# Patient Record
Sex: MALE | Race: WHITE | Employment: UNEMPLOYED | URBAN - METROPOLITAN AREA
[De-identification: names, ages, dates, MRNs, and addresses within clinical notes are randomized per-mention and may not be internally consistent; named-entity substitution may affect disease eponyms.]

---

## 2017-01-17 ENCOUNTER — PATIENT OUTREACH (OUTPATIENT)
Dept: CARDIOLOGY CLINIC | Age: 62
End: 2017-01-17

## 2017-01-17 ENCOUNTER — TELEPHONE (OUTPATIENT)
Dept: CARDIOLOGY CLINIC | Age: 62
End: 2017-01-17

## 2017-01-17 NOTE — TELEPHONE ENCOUNTER
Mr. Abdullahi missed his hospital follow up appointment on 1/16/17.  I as able to speak with him and he states that he doesn't feel he needs to be seen since the problem he was having was not heart related.     Thank you, Cassandra

## 2017-01-17 NOTE — PROGRESS NOTES
Received notification that Mr. Lito Carter did not attend hospital follow up appointment. Mr. Lito Carter had been in-pt at [facility] from 12/14-12/20/17 for acute respiratory failure, and was are-admitted 12/26-12/30/17- was discharged from [facility] to [facility] from 12/30-1/6/17. NOw is at home with [facility] services in place. During second admission he had NSTEMI from cardiac strain. Cardiac PMH HTN. Left  asking for return call- he called later- he said he is doing well. Denies any symptoms of breathing, fluid or chest discomfort- decreased activity tolerance. Explained that he had a heart attack during his hospital stay the second admission at [facility] before he went to [facility] rehab. The appointment was scheduled to follow up on that- explained that the notes say that a stress test will most likely be done to assess heart muscle damage-ischemia. He agreed to an appointment. Had spoken with his mother (he is still staying with her- until his house gets some home improvements done) and rescheduled for this coming Monday. His mother relayed that he is seeing a new PCP at the [place] Friday- she will bring his name, phone number and fax number so we can add to the care team and asked that Mr. Abdullahi add Dr. Leatha Mantilla to his care team so the [place] could share information with our office as well. Torres Matias is still working with him post discharge from [facility] on 1/6/17- he has nursing, PT and OT. Pt was just there this afternoon when I had left the  for Mr. Abdullahi the first time. Provided both with my contact information - introduced self and NN role.

## 2017-01-17 NOTE — TELEPHONE ENCOUNTER
Mr. Noa Cueto missed his hospital follow up appointment on 1/16/17. I as able to speak with him and he states that he doesn't feel he needs to be seen since the problem he was having was not heart related.      Thank you, Shawnee Shannon

## 2017-01-23 ENCOUNTER — OFFICE VISIT (OUTPATIENT)
Dept: CARDIOLOGY CLINIC | Age: 62
End: 2017-01-23

## 2017-01-23 VITALS
BODY MASS INDEX: 37.37 KG/M2 | HEIGHT: 70 IN | HEART RATE: 91 BPM | SYSTOLIC BLOOD PRESSURE: 120 MMHG | DIASTOLIC BLOOD PRESSURE: 70 MMHG | WEIGHT: 261 LBS | RESPIRATION RATE: 20 BRPM

## 2017-01-23 DIAGNOSIS — J44.1 COPD WITH ACUTE EXACERBATION (HCC): ICD-10-CM

## 2017-01-23 DIAGNOSIS — E78.00 HYPERCHOLESTEREMIA: ICD-10-CM

## 2017-01-23 DIAGNOSIS — I21.4 NSTEMI (NON-ST ELEVATED MYOCARDIAL INFARCTION) (HCC): Primary | ICD-10-CM

## 2017-01-23 NOTE — PATIENT INSTRUCTIONS
Myrna Stress nuclear for positive troponin ASAP  Follow up in 4 months with Dr. Linh Almanza  Phone follow up of stress results

## 2017-01-23 NOTE — PROGRESS NOTES
Office Follow-up    NAME: Ailin Escobar    :  1955  MRM:  [id number]    Date:  2017            Assessment:     Problem List  Date Reviewed: 2017          Codes Class Noted    NSTEMI (non-ST elevated myocardial infarction) [facility]) ICD-10-CM: I21.4  ICD-9-CM: 410.70  2017        Anis[place]oria ICD-10-CM: H57.02  ICD-9-CM: 379.41  2016        History of Bell's palsy (Chronic) ICD-10-CM: Z86.69  ICD-9-CM: V12.49  2016    Overview Signed 2016  4:00 PM by LAMONT Castillo bell's palsy hx             Acute respiratory failure ([facility] ICD-10-CM: J96.00  ICD-9-CM: 518.81  2016        Acute on chronic respiratory failure ([facility] ICD-10-CM: J96.20  ICD-9-CM: 518.84  2016        Elevated troponin ICD-10-CM: R79.89  ICD-9-CM: 790.6  2016        Depression with anxiety ICD-10-CM: F41.8  ICD-9-CM: 300.4  2016        Chronic back pain greater than 3 months duration ICD-10-CM: M54.9, G89.29  ICD-9-CM: 724.5, 338.29  6/15/2016        Chronic pain of both knees ICD-10-CM: M25.561, M25.562, G89.29  ICD-9-CM: 719.46, 338.29  6/15/2016        COPD with acute exacerbation [facility]) ICD-10-CM: J44.1  ICD-9-CM: 491.21  2016        Chronic respiratory insufficiency ICD-10-CM: R06.89  ICD-9-CM: 786.09  Unknown        GERD (gastroesophageal reflux disease) ICD-10-CM: K21.9  ICD-9-CM: 530.81  Unknown        Hypercholesteremia ICD-10-CM: E78.00  ICD-9-CM: 272.0  Unknown        COPD (chronic obstructive pulmonary disease) ([facility] ICD-10-CM: J44.9  ICD-9-CM: 786  Unknown        Hypothyroidism ICD-10-CM: E03.9  ICD-9-CM: 244.9  Unknown        Chronic pain due to injury ICD-10-CM: G89.21  ICD-9-CM: 338.21  Unknown        Acute on chronic respiratory failure with hypoxia and hypercapnia (HCC) ICD-10-CM: J96.21, J96.22  ICD-9-CM: 518.84, 786.09, 799.02  2013        Narcotic overdose ICD-10-CM: T40.601A  ICD-9-CM: 967.9, E980.2  2013        Hypercapnemia ICD-10-CM: R06.89  ICD-9-CM: 786.09  2/22/2013                 Plan:     1. Positive troponin/mild HK of inferior wall: further evaluation with Lexiscan stress nuclear study. [facility]. 2. COPD: continue nasal O2  3. HTN: continue labetalol  4. Follow up in 4 months                        Subjective:     Oskar Okeefe, a 63y.o. year-old who presents for hospital follow up after being admitted for respiratory failure. He was on BiPAP and recovered from what seemed to be COPD exacerbation. At the time of presentation he had positive troponin at 0.22. He also had echo which showed basal inferior wall hypo kinesis. He was discharged home to be fu in office for positive troponin. He denies any symptoms of chest pain lightheadedness or dizziness. He has baseline SOB from COPD and uses 1.5 to 2 liters of oxygen continuously. He conveyed that he had prior cardiac cath at [place] which we do not have access right now. He also is on lung transplant list     Exam:     Physical Exam:  Visit Vitals    /70 (BP 1 Location: Left arm, BP Patient Position: Sitting)    Pulse 91    Resp 20    Ht 5' 10\" (1.778 m)    Wt 261 lb (118.4 kg)    BMI 37.45 kg/m2     General appearance - alert, well appearing, and in no distress  Mental status - affect appropriate to mood  Eyes - sclera anicteric, moist mucous membranes  Neck - supple, no significant adenopathy  Chest - Diffuse decrease in air entry in both lung fields. No wheezes, rales or rhonchi  Heart - normal rate, regular rhythm, normal S1, S2, no murmurs, rubs, clicks or gallops  Abdomen - soft, nontender, nondistended, no masses or organomegaly  Extremities - peripheral pulses normal, no pedal edema  Skin - normal coloration  no rashes    Medications:     Current Outpatient Prescriptions   Medication Sig    oxyCODONE IR (ROXICODONE) 5 mg immediate release tablet Take 1 Tab by mouth every four (4) hours as needed for Pain.  Max Daily Amount: 30 mg.    labetalol (NORMODYNE) 100 mg tablet Take 1 Tab by mouth two (2) times a day.  levothyroxine (SYNTHROID) 50 mcg tablet Take 1 Tab by mouth Daily (before breakfast).  predniSONE (DELTASONE) 10 mg tablet Take 40mg (4 tabs) daily for 3 days, then 30mg (3 tabs) daily for 3 days, then 20mg (2 tabs) daily for 3 days, then 10mg daily for 3 days (Patient taking differently: Take 10 mg by mouth daily. Take 40mg (4 tabs) daily for 3 days, then 30mg (3 tabs) daily for 3 days, then 20mg (2 tabs) daily for 3 days, then 10mg daily for 3 days)    DULoxetine (CYMBALTA) 30 mg capsule Take 90 mg by mouth daily.  senna-docusate (PERICOLACE) 8.6-50 mg per tablet Take 1 Tab by mouth two (2) times a day. (Patient taking differently: Take 3 Tabs by mouth daily.)    cholecalciferol (VITAMIN D3) 1,000 unit tablet Take 1,000 Units by mouth daily.  fluticasone (FLONASE) 50 mcg/actuation nasal spray 1 [place] by Both Nostrils route daily.  albuterol (PROVENTIL HFA, VENTOLIN HFA, PROAIR HFA) 90 mcg/actuation inhaler Take 2 Puffs by inhalation every four (4) hours as needed for Wheezing.  loratadine (CLARITIN) 10 mg tablet Take 10 mg by mouth daily.  budesonide-formoterol (SYMBICORT) 160-4.5 mcg/actuation HFA inhaler Take 2 Puffs by inhalation two (2) times a day.  vardenafil (LEVITRA) 20 mg tablet Take 20 mg by mouth as needed. One tab one hr prior to activity. Max twice/month    tiotropium (SPIRIVA WITH HANDIHALER) 18 mcg inhalation capsule Take 1 Cap by inhalation daily.  albuterol (PROVENTIL VENTOLIN) 2.5 mg /3 mL (0.083 %) nebulizer solution 2.5 mg by Nebulization route four (4) times daily as needed.  multivitamin with iron tablet Take 1 Tab by mouth daily.  busPIRone (BUSPAR) 10 mg tablet Take 20 mg by mouth three (3) times daily.  gabapentin (NEURONTIN) 300 mg tablet Take 300 mg by mouth every six (6) hours.  simvastatin (ZOCOR) 20 mg tablet Take 10 mg by mouth nightly.     buPROPion SR (WELLBUTRIN SR) 100 mg SR tablet Take 100 mg by mouth daily. No current facility-administered medications for this visit. Diagnostic Data Review:     EKG:    ECHO 12/26/2016: EF 55%, mild HK basal inferior wall(s). Mild LVH. Lab Review:     Lab Results   Component Value Date/Time    Cholesterol, total 179 12/27/2016 02:24 AM    HDL Cholesterol 85 12/27/2016 02:24 AM    LDL, calculated 72.4 12/27/2016 02:24 AM    Triglyceride 108 12/27/2016 02:24 AM    CHOL/HDL Ratio 2.1 12/27/2016 02:24 AM     Lab Results   Component Value Date/Time    Creatinine (POC) 0.8 08/27/2014 07:02 PM    Creatinine 0.89 12/30/2016 04:31 AM     Lab Results   Component Value Date/Time    BUN 18 12/30/2016 04:31 AM    BUN (POC) 10 08/27/2014 07:02 PM     Lab Results   Component Value Date/Time    Potassium 3.6 12/30/2016 04:31 AM     No results found for: HBA1C, HGBE8, TBY8JKKR, XRT1OJCZ  Lab Results   Component Value Date/Time    Hemoglobin (POC) 14.6 08/27/2014 07:02 PM    HGB 11.7 12/31/2016 02:45 AM     Lab Results   Component Value Date/Time    PLATELET 215 92/95/8206 02:45 AM     No results for input(s): CPK, CKMB, TROIQ in the last 72 hours. No lab exists for component: CKQMB, CPKMB             ___________________________________________________    Alessandra Jimenez.  Job Garvin MD, [facility] [place]    This note was written by beni Ruiz, as dictated by Madi Livingston MD.

## 2017-02-02 ENCOUNTER — HOSPITAL ENCOUNTER (INPATIENT)
Age: 62
LOS: 2 days | Discharge: HOME | DRG: 189 | End: 2017-02-04
Attending: EMERGENCY MEDICINE | Admitting: INTERNAL MEDICINE
Payer: MEDICARE

## 2017-02-02 ENCOUNTER — APPOINTMENT (OUTPATIENT)
Dept: GENERAL RADIOLOGY | Age: 62
DRG: 189 | End: 2017-02-02
Attending: EMERGENCY MEDICINE
Payer: MEDICARE

## 2017-02-02 DIAGNOSIS — J44.1 COPD EXACERBATION (HCC): Primary | ICD-10-CM

## 2017-02-02 DIAGNOSIS — R06.09 DYSPNEA ON EXERTION: ICD-10-CM

## 2017-02-02 DIAGNOSIS — R77.8 ELEVATED TROPONIN I LEVEL: ICD-10-CM

## 2017-02-02 LAB
ANION GAP BLD CALC-SCNC: 0 MMOL/L (ref 5–15)
BASOPHILS # BLD AUTO: 0 K/UL (ref 0–0.1)
BASOPHILS # BLD: 0 % (ref 0–1)
BNP SERPL-MCNC: 213 PG/ML (ref 0–125)
BUN SERPL-MCNC: 11 MG/DL (ref 6–20)
BUN/CREAT SERPL: 14 (ref 12–20)
CALCIUM SERPL-MCNC: 8.9 MG/DL (ref 8.5–10.1)
CHLORIDE SERPL-SCNC: 104 MMOL/L (ref 97–108)
CK SERPL-CCNC: 43 U/L (ref 39–308)
CO2 SERPL-SCNC: 40 MMOL/L (ref 21–32)
CREAT SERPL-MCNC: 0.81 MG/DL (ref 0.7–1.3)
EOSINOPHIL # BLD: 0 K/UL (ref 0–0.4)
EOSINOPHIL NFR BLD: 0 % (ref 0–7)
ERYTHROCYTE [DISTWIDTH] IN BLOOD BY AUTOMATED COUNT: 13.4 % (ref 11.5–14.5)
GLUCOSE SERPL-MCNC: 119 MG/DL (ref 65–100)
HCT VFR BLD AUTO: 34.9 % (ref 36.6–50.3)
HGB BLD-MCNC: 10.6 G/DL (ref 12.1–17)
LYMPHOCYTES # BLD AUTO: 15 % (ref 12–49)
LYMPHOCYTES # BLD: 1.1 K/UL (ref 0.8–3.5)
MAGNESIUM SERPL-MCNC: 2 MG/DL (ref 1.6–2.4)
MCH RBC QN AUTO: 29.4 PG (ref 26–34)
MCHC RBC AUTO-ENTMCNC: 30.4 G/DL (ref 30–36.5)
MCV RBC AUTO: 96.9 FL (ref 80–99)
MONOCYTES # BLD: 0.5 K/UL (ref 0–1)
MONOCYTES NFR BLD AUTO: 6 % (ref 5–13)
NEUTS SEG # BLD: 5.8 K/UL (ref 1.8–8)
NEUTS SEG NFR BLD AUTO: 79 % (ref 32–75)
PLATELET # BLD AUTO: 276 K/UL (ref 150–400)
POTASSIUM SERPL-SCNC: 4 MMOL/L (ref 3.5–5.1)
RBC # BLD AUTO: 3.6 M/UL (ref 4.1–5.7)
SODIUM SERPL-SCNC: 144 MMOL/L (ref 136–145)
TROPONIN I SERPL-MCNC: 0.05 NG/ML
TROPONIN I SERPL-MCNC: 0.07 NG/ML
WBC # BLD AUTO: 7.4 K/UL (ref 4.1–11.1)

## 2017-02-02 PROCEDURE — 83735 ASSAY OF MAGNESIUM: CPT | Performed by: EMERGENCY MEDICINE

## 2017-02-02 PROCEDURE — 77030013140 HC MSK NEB VYRM -A

## 2017-02-02 PROCEDURE — 99285 EMERGENCY DEPT VISIT HI MDM: CPT

## 2017-02-02 PROCEDURE — 94640 AIRWAY INHALATION TREATMENT: CPT

## 2017-02-02 PROCEDURE — 74011250636 HC RX REV CODE- 250/636: Performed by: EMERGENCY MEDICINE

## 2017-02-02 PROCEDURE — 74011250637 HC RX REV CODE- 250/637: Performed by: INTERNAL MEDICINE

## 2017-02-02 PROCEDURE — 80048 BASIC METABOLIC PNL TOTAL CA: CPT | Performed by: EMERGENCY MEDICINE

## 2017-02-02 PROCEDURE — 94762 N-INVAS EAR/PLS OXIMTRY CONT: CPT

## 2017-02-02 PROCEDURE — 82550 ASSAY OF CK (CPK): CPT | Performed by: EMERGENCY MEDICINE

## 2017-02-02 PROCEDURE — 74011000250 HC RX REV CODE- 250: Performed by: INTERNAL MEDICINE

## 2017-02-02 PROCEDURE — 36415 COLL VENOUS BLD VENIPUNCTURE: CPT | Performed by: INTERNAL MEDICINE

## 2017-02-02 PROCEDURE — 71020 XR CHEST PA LAT: CPT

## 2017-02-02 PROCEDURE — 74011250636 HC RX REV CODE- 250/636: Performed by: INTERNAL MEDICINE

## 2017-02-02 PROCEDURE — 74011000250 HC RX REV CODE- 250: Performed by: EMERGENCY MEDICINE

## 2017-02-02 PROCEDURE — 81001 URINALYSIS AUTO W/SCOPE: CPT | Performed by: EMERGENCY MEDICINE

## 2017-02-02 PROCEDURE — 84484 ASSAY OF TROPONIN QUANT: CPT | Performed by: EMERGENCY MEDICINE

## 2017-02-02 PROCEDURE — 96374 THER/PROPH/DIAG INJ IV PUSH: CPT

## 2017-02-02 PROCEDURE — 85025 COMPLETE CBC W/AUTO DIFF WBC: CPT | Performed by: EMERGENCY MEDICINE

## 2017-02-02 PROCEDURE — 83880 ASSAY OF NATRIURETIC PEPTIDE: CPT | Performed by: EMERGENCY MEDICINE

## 2017-02-02 PROCEDURE — 65660000000 HC RM CCU STEPDOWN

## 2017-02-02 RX ORDER — SODIUM CHLORIDE 0.9 % (FLUSH) 0.9 %
5-10 SYRINGE (ML) INJECTION EVERY 8 HOURS
Status: DISCONTINUED | OUTPATIENT
Start: 2017-02-02 | End: 2017-02-04 | Stop reason: HOSPADM

## 2017-02-02 RX ORDER — IPRATROPIUM BROMIDE AND ALBUTEROL SULFATE 2.5; .5 MG/3ML; MG/3ML
3 SOLUTION RESPIRATORY (INHALATION)
Status: DISCONTINUED | OUTPATIENT
Start: 2017-02-02 | End: 2017-02-04 | Stop reason: HOSPADM

## 2017-02-02 RX ORDER — ONDANSETRON 2 MG/ML
4 INJECTION INTRAMUSCULAR; INTRAVENOUS
Status: DISCONTINUED | OUTPATIENT
Start: 2017-02-02 | End: 2017-02-04 | Stop reason: HOSPADM

## 2017-02-02 RX ORDER — HYDROCODONE BITARTRATE AND ACETAMINOPHEN 5; 325 MG/1; MG/1
1 TABLET ORAL
Status: DISCONTINUED | OUTPATIENT
Start: 2017-02-02 | End: 2017-02-04 | Stop reason: HOSPADM

## 2017-02-02 RX ORDER — MELATONIN
1000 DAILY
Status: DISCONTINUED | OUTPATIENT
Start: 2017-02-03 | End: 2017-02-04 | Stop reason: HOSPADM

## 2017-02-02 RX ORDER — PREDNISONE 10 MG/1
10 TABLET ORAL
COMMUNITY
End: 2017-02-04

## 2017-02-02 RX ORDER — FLUTICASONE FUROATE AND VILANTEROL 100; 25 UG/1; UG/1
1 POWDER RESPIRATORY (INHALATION) DAILY
Status: DISCONTINUED | OUTPATIENT
Start: 2017-02-03 | End: 2017-02-03

## 2017-02-02 RX ORDER — LORATADINE 10 MG/1
10 TABLET ORAL DAILY
Status: DISCONTINUED | OUTPATIENT
Start: 2017-02-03 | End: 2017-02-04 | Stop reason: HOSPADM

## 2017-02-02 RX ORDER — SIMVASTATIN 5 MG/1
10 TABLET, FILM COATED ORAL
Status: DISCONTINUED | OUTPATIENT
Start: 2017-02-02 | End: 2017-02-04 | Stop reason: HOSPADM

## 2017-02-02 RX ORDER — DULOXETIN HYDROCHLORIDE 30 MG/1
90 CAPSULE, DELAYED RELEASE ORAL DAILY
Status: DISCONTINUED | OUTPATIENT
Start: 2017-02-03 | End: 2017-02-04 | Stop reason: HOSPADM

## 2017-02-02 RX ORDER — LEVOTHYROXINE SODIUM 50 UG/1
50 TABLET ORAL
Status: DISCONTINUED | OUTPATIENT
Start: 2017-02-03 | End: 2017-02-04 | Stop reason: HOSPADM

## 2017-02-02 RX ORDER — GABAPENTIN 300 MG/1
300 CAPSULE ORAL EVERY 6 HOURS
Status: DISCONTINUED | OUTPATIENT
Start: 2017-02-02 | End: 2017-02-04 | Stop reason: HOSPADM

## 2017-02-02 RX ORDER — FLUTICASONE PROPIONATE 50 MCG
1 SPRAY, SUSPENSION (ML) NASAL DAILY
Status: DISCONTINUED | OUTPATIENT
Start: 2017-02-03 | End: 2017-02-04 | Stop reason: HOSPADM

## 2017-02-02 RX ORDER — ENOXAPARIN SODIUM 100 MG/ML
40 INJECTION SUBCUTANEOUS EVERY 24 HOURS
Status: DISCONTINUED | OUTPATIENT
Start: 2017-02-02 | End: 2017-02-04 | Stop reason: HOSPADM

## 2017-02-02 RX ORDER — SODIUM CHLORIDE 0.9 % (FLUSH) 0.9 %
5-10 SYRINGE (ML) INJECTION AS NEEDED
Status: DISCONTINUED | OUTPATIENT
Start: 2017-02-02 | End: 2017-02-04 | Stop reason: HOSPADM

## 2017-02-02 RX ORDER — LABETALOL 100 MG/1
100 TABLET, FILM COATED ORAL 2 TIMES DAILY
Status: DISCONTINUED | OUTPATIENT
Start: 2017-02-02 | End: 2017-02-04 | Stop reason: HOSPADM

## 2017-02-02 RX ORDER — ZOLPIDEM TARTRATE 5 MG/1
5 TABLET ORAL
Status: DISCONTINUED | OUTPATIENT
Start: 2017-02-02 | End: 2017-02-04 | Stop reason: HOSPADM

## 2017-02-02 RX ORDER — BUSPIRONE HYDROCHLORIDE 5 MG/1
20 TABLET ORAL 3 TIMES DAILY
Status: DISCONTINUED | OUTPATIENT
Start: 2017-02-02 | End: 2017-02-04 | Stop reason: HOSPADM

## 2017-02-02 RX ORDER — BUPROPION HYDROCHLORIDE 100 MG/1
100 TABLET, EXTENDED RELEASE ORAL DAILY
Status: DISCONTINUED | OUTPATIENT
Start: 2017-02-03 | End: 2017-02-02

## 2017-02-02 RX ORDER — AMOXICILLIN 250 MG
3 CAPSULE ORAL DAILY
Status: DISCONTINUED | OUTPATIENT
Start: 2017-02-03 | End: 2017-02-04 | Stop reason: HOSPADM

## 2017-02-02 RX ADMIN — HYDROCODONE BITARTRATE AND ACETAMINOPHEN 1 TABLET: 5; 325 TABLET ORAL at 20:33

## 2017-02-02 RX ADMIN — GABAPENTIN 300 MG: 300 CAPSULE ORAL at 19:00

## 2017-02-02 RX ADMIN — AZITHROMYCIN MONOHYDRATE 500 MG: 500 INJECTION, POWDER, LYOPHILIZED, FOR SOLUTION INTRAVENOUS at 19:00

## 2017-02-02 RX ADMIN — LABETALOL HCL 100 MG: 100 TABLET, FILM COATED ORAL at 20:00

## 2017-02-02 RX ADMIN — ALBUTEROL SULFATE 1 DOSE: 2.5 SOLUTION RESPIRATORY (INHALATION) at 16:07

## 2017-02-02 RX ADMIN — IPRATROPIUM BROMIDE AND ALBUTEROL SULFATE 3 ML: .5; 2.5 SOLUTION RESPIRATORY (INHALATION) at 21:13

## 2017-02-02 RX ADMIN — BUSPIRONE HYDROCHLORIDE 20 MG: 5 TABLET ORAL at 21:48

## 2017-02-02 RX ADMIN — SIMVASTATIN 10 MG: 5 TABLET, FILM COATED ORAL at 21:49

## 2017-02-02 RX ADMIN — METHYLPREDNISOLONE SODIUM SUCCINATE 125 MG: 125 INJECTION, POWDER, FOR SOLUTION INTRAMUSCULAR; INTRAVENOUS at 16:05

## 2017-02-02 RX ADMIN — ENOXAPARIN SODIUM 40 MG: 40 INJECTION SUBCUTANEOUS at 21:49

## 2017-02-02 RX ADMIN — Medication 10 ML: at 21:55

## 2017-02-02 RX ADMIN — METHYLPREDNISOLONE SODIUM SUCCINATE 60 MG: 40 INJECTION, POWDER, FOR SOLUTION INTRAMUSCULAR; INTRAVENOUS at 21:48

## 2017-02-02 NOTE — H&P
Admission History and Physical      NAME:  Anna Willis :   1955   MRN:  [id number]     PCP:  Dexter Carrion MD     Date/Time:  2017           Assessment/Plan:       Active Problems:      Acute on chronic respiratory failure / COPD exacerbation:  Unclear precipitant. Sx started shortly after finishing pred taper. ?steroid dependent. Admit to telemetry. Pulm consult. IV steroids. Duo-nebs. Breo for symbicort. Hold spiriva. Increase in sputum so added Azithromycin. Elevated troponin: seen by reji. Has outpt lexiscan scheduled for . Had + troponins during last COPD exacerbation. Trend Daniela. If continue to increase, can ask cards to see but likely can wait until outpatient follow-up. Depression with anxiety: continue home buspar and cymbalta    Hypothyroidism. Check TSH. Continue LT4 at home dose    Chronic pain of both knees: Continue home norco and gabapentin    Essential hypertension: Continue labetalol    Hyperlipidemia: Continue Zocor         Subjective:     CHIEF COMPLAINT: Worsening SOB and WANG    HISTORY OF PRESENT ILLNESS:     Mr. Riya Degroot is a 63 y.o.  male with recent admission for COPD exacerbation with chronic hypoxemic resp failure, NSTEMI, HTN, hypothyroidism who is admitted with worsening SOB/WANG and hypoxemia concerning for acute COPD exacerbation on acute on chronic hypoxemic respiratory failure. Mr. Riya Degroot presented to the Emergency Department today complaining of 2+ weeks of worsening SOB and WANG. Sx started within 1 week of stopping prednisone taper. Pt. [place] well leaving rehab but then had worsening in breathing and cough. Seen by PCP and given guaf but no improvement. Patient states gradual worsening until today when he could only ambulate 15-20 ft with drop in POX to 50s on oxygen. He denies any chest pain, fever or chills. Cough has somewhat increased. We will admit for further management.       Past Medical History   Diagnosis Date    Anxiety     Chronic pain due to injury     Chronic respiratory insufficiency     COPD (chronic obstructive pulmonary disease) (HCC)     Depression     GERD (gastroesophageal reflux disease)     History of Bell's palsy 12/29/2016    Hypercholesteremia     Hypothyroid         Past Surgical History   Procedure Laterality Date    Hx orthopaedic       left leg, right hip, knee pelvis       Social History   Substance Use Topics    Smoking status: Former Smoker     Packs/day: 2.00    Smokeless tobacco: Not on file    Alcohol use No      Comment: none x 4-5 years        Family History   Problem Relation Age of Onset    Cancer Mother     COPD Father     Cancer Father         Allergies   Allergen Reactions    Tramadol Anxiety        Prior to Admission medications    Medication Sig Start Date End Date Taking? Authorizing Provider   oxyCODONE IR (ROXICODONE) 5 mg immediate release tablet Take 1 Tab by mouth every four (4) hours as needed for Pain. Max Daily Amount: 30 mg. 12/30/16   Zach DAVIS Do, MD   labetalol (NORMODYNE) 100 mg tablet Take 1 Tab by mouth two (2) times a day. 12/30/16   Zach DAVIS Do, MD   levothyroxine (SYNTHROID) 50 mcg tablet Take 1 Tab by mouth Daily (before breakfast). 12/30/16   Zach DAVIS Do, MD   predniSONE (DELTASONE) 10 mg tablet Take 40mg (4 tabs) daily for 3 days, then 30mg (3 tabs) daily for 3 days, then 20mg (2 tabs) daily for 3 days, then 10mg daily for 3 days  Patient taking differently: Take 10 mg by mouth daily. Take 40mg (4 tabs) daily for 3 days, then 30mg (3 tabs) daily for 3 days, then 20mg (2 tabs) daily for 3 days, then 10mg daily for 3 days 12/30/16   Zoran Hitchcock MD   buPROPion SR [facility] SR) 100 mg SR tablet Take 100 mg by mouth daily. Historical Provider   DULoxetine (CYMBALTA) 30 mg capsule Take 90 mg by mouth daily. Historical Provider   senna-docusate (PERICOLACE) 8.6-50 mg per tablet Take 1 Tab by mouth two (2) times a day.   Patient taking differently: Take 3 Tabs by mouth daily. 6/17/16   Humberto Rucker MD   cholecalciferol (VITAMIN D3) 1,000 unit tablet Take 1,000 Units by mouth daily. Historical Provider   fluticasone (FLONASE) 50 mcg/actuation nasal spray 1 [place] by Both Nostrils route daily. Historical Provider   albuterol (PROVENTIL HFA, VENTOLIN HFA, PROAIR HFA) 90 mcg/actuation inhaler Take 2 Puffs by inhalation every four (4) hours as needed for Wheezing. Historical Provider   loratadine (CLARITIN) 10 mg tablet Take 10 mg by mouth daily. Historical Provider   budesonide-formoterol (SYMBICORT) 160-4.5 mcg/actuation HFA inhaler Take 2 Puffs by inhalation two (2) times a day. Historical Provider   vardenafil (LEVITRA) 20 mg tablet Take 20 mg by mouth as needed. One tab one hr prior to activity. Max twice/month    Wendi Sanchez MD   tiotropium (SPIRIVA WITH HANDIHALER) 18 mcg inhalation capsule Take 1 Cap by inhalation daily. Wendi Sanchez MD   albuterol (PROVENTIL VENTOLIN) 2.5 mg /3 mL (0.083 %) nebulizer solution 2.5 mg by Nebulization route four (4) times daily as needed. Wendi Sanchez MD   multivitamin with iron tablet Take 1 Tab by mouth daily. Wendi Sanchez MD   busPIRone (BUSPAR) 10 mg tablet Take 20 mg by mouth three (3) times daily. Wendi Sanchez MD   gabapentin (NEURONTIN) 300 mg tablet Take 300 mg by mouth every six (6) hours. Wendi Sanchez MD   simvastatin (ZOCOR) 20 mg tablet Take 10 mg by mouth nightly.     Wendi Sanchez MD         Review of Systems:  (bold if positive, if negative)    Gen:  Eyes:  ENT:  CVS:  Pulm:  Cough, dyspneawheezingGI:    :    MS:  Skin:  Psych:  Endo:    Hem:  Renal:    Neuro:            Objective:      VITALS:    Vital signs reviewed; most recent are:    Visit Vitals    /63    Pulse 68    Temp 97.9 °F (36.6 °C)    Resp 16    Ht 5' 10\" (1.778 m)    Wt 114.3 kg (252 lb)    SpO2 94%    BMI 36.16 kg/m2     SpO2 Readings from Last 6 Encounters:   02/02/17 94%   12/30/16 99%   12/20/16 98%   06/17/16 97%   08/27/14 97% 02/25/13 96%    O2 Flow Rate (L/min): 4 l/min   No intake or output data in the 24 hours ending 02/02/17 1817         Exam:     Physical Exam:    Gen:  Chronically ill-appearing, dishevled, in mild acute distress  HEENT:  Pink conjunctivae, PERRL, hearing intact to voice, moist mucous membranes  Neck:  Supple, without masses, thyroid non-tender  Resp:  Increased respiratory rate, tripod positinging, bilateral wheezing with background crackles on both sides. Card:  No murmurs, normal S1, S2 without thrills, bruits or peripheral edema  Abd:  Soft, non-tender, non-distended, normoactive bowel sounds are present  Lymph:  No cervical adenopathy  Musc:  No cyanosis or clubbing  Skin:  No rashes or ulcers, skin turgor is good  Neuro:  Cranial nerves 3-12 are grossly intact, speech is fluent, tongue is midline, follows commands appropriately  Psych:  Alert with good insight. Oriented to person, place, and time       Labs:    Recent Labs      02/02/17   1525   WBC  7.4   HGB  10.6*   HCT  34.9*   PLT  276     Recent Labs      02/02/17   1525   NA  144   K  4.0   CL  104   CO2  40*   GLU  119*   BUN  11   CREA  0.81   CA  8.9   MG  2.0     Lab Results   Component Value Date/Time    Glucose (POC) 101 12/14/2016 07:02 PM    Glucose (POC) 107 08/27/2014 07:02 PM    Glucose (POC) 136 02/23/2013 11:42 AM     No results for input(s): PH, PCO2, PO2, HCO3, FIO2 in the last 72 hours. No results for input(s): INR in the last 72 hours. No lab exists for component: INREXT    Chest Xray:  No acute airspace disease    Medical records reviewed in preparation for this admission: Old medical records. Nursing notes.     Surrogate decision maker:  patient    Total time spent with patient: [address] discussed with: Patient, Nursing Staff and >50% of time spent in counseling and coordination of care    Discussed:  Care Plan and D/C Planning    Prophylaxis:  Lovenox    Probable Disposition:  Home w/Family and [facility]/Rehab           ___________________________________________________    Attending Physician: Edison Lentz DO

## 2017-02-02 NOTE — ED PROVIDER NOTES
HPI Comments: 63 y.o. male with past medical history significant for COPD, hypothyroidism, anxiety, depression, and hypercholesteremia who presents via EMS with chief complaint of SOB. For the past couple weeks, patient has had increased SOB for the past 2 weeks. Patient notes that breathing markedly worsens upon exertion, and is at times accompanied by HA and chest pain. Patient additionally complains of mild cough. Upon EMS arrival, patient's sats were noted to be 68-70's. While en route to the ED, patient was placed on CPAP and received 10 Decadron. Patient is chronically on 2L O2 at home. Patient denies any fever. There are no other acute medical concerns at this time. Social hx: former tobacco smoker; denies EtOH  PCP: No primary care provider on file. Note written by Phillip Addison, as dictated by Alondra Balderrama MD 3:15 PM    The history is provided by the patient and the EMS personnel. Past Medical History:   Diagnosis Date    Anxiety     Chronic pain due to injury     Chronic respiratory insufficiency     COPD (chronic obstructive pulmonary disease) (HCC)     Depression     GERD (gastroesophageal reflux disease)     History of Bell's palsy 12/29/2016    Hypercholesteremia     Hypothyroid        Past Surgical History:   Procedure Laterality Date    Hx orthopaedic       left leg, right hip, knee pelvis         Family History:   Problem Relation Age of Onset    Cancer Mother     COPD Father     Cancer Father        Social History     Social History    Marital status:      Spouse name: N/A    Number of children: N/A    Years of education: N/A     Occupational History    Not on file. Social History Main Topics    Smoking status: Former Smoker     Packs/day: 2.00    Smokeless tobacco: Not on file    Alcohol use No      Comment: none x 4-5 years    Drug use:  Yes    Sexual activity: Not on file      Comment: pt denies ilicit drugs     Other Topics Concern  Not on file     Social History Narrative         ALLERGIES: Tramadol    Review of Systems   Constitutional: Negative for activity change and fever. HENT: Positive for congestion. Eyes: Negative for pain. Respiratory: Positive for cough and shortness of breath. Cardiovascular: Positive for chest pain. Negative for leg swelling. Gastrointestinal: Negative for abdominal pain and vomiting. Endocrine: Negative for polyuria. Genitourinary: Negative for flank pain and hematuria. Musculoskeletal: Negative for gait problem, neck pain and neck stiffness. Skin: Negative for color change. Allergic/Immunologic: Negative for immunocompromised state. Neurological: Positive for headaches. Negative for speech difficulty. Hematological: Does not bruise/bleed easily. Psychiatric/Behavioral: Negative for confusion. All other systems reviewed and are negative. Vitals:    02/02/17 1507 02/02/17 1608 02/02/17 1615 02/02/17 1630   BP:   129/59 118/59   Pulse:   66 72   Resp:   12 21   Temp:       SpO2: 100% 95% (!) 86% 93%   Weight:       Height:                Physical Exam   Constitutional: He is oriented to person, place, and time. He appears well-developed and well-nourished. No distress. HENT:   Head: Normocephalic and atraumatic. Right Ear: External ear normal.   Left Ear: External ear normal.   Eyes: EOM are normal. Pupils are equal, round, and reactive to light. Neck: Normal range of motion. Neck supple. No JVD present. No tracheal deviation present. Cardiovascular: Normal rate, regular rhythm and normal heart sounds. Exam reveals no gallop and no friction rub. No murmur heard. Pulmonary/Chest: Effort normal. No stridor. No respiratory distress. He has wheezes (faint, expiratory, bilateral). He has no rales. On nasal cannula 2L. O2 sats 98% on RA. Abdominal: Soft. Bowel sounds are normal. He exhibits no distension. There is no tenderness. There is no rebound and no guarding. Musculoskeletal: Normal range of motion. He exhibits edema. He exhibits no tenderness. 1+ edema in legs. Neurological: He is alert and oriented to person, place, and time. He has normal reflexes. No cranial nerve deficit. Coordination normal.   Skin: Skin is warm and dry. No rash noted. He is not diaphoretic. No erythema. Psychiatric: He has a normal mood and affect. His behavior is normal. Judgment and thought content normal.   Nursing note and vitals reviewed. Note written by Phillip Bernard, as dictated by Declan Berry MD 3:15 PM    MDM  Number of Diagnoses or Management Options  Diagnosis management comments: 62-year-old white male presents to the emergency department with difficulty breathing. Patient has a history of coronary artery disease. He also is a history of COPD. Patient is on oxygen chronically. He feels that his breathing has been worsening over the past several weeks. We'll check blood tests. Will check chest x-ray. We'll give site Medrol and a breathing treatment. We'll reassess when testing his back. Patient agrees with this plan. Amount and/or Complexity of Data Reviewed  Clinical lab tests: ordered and reviewed  Tests in the radiology section of CPT®: ordered and reviewed  Tests in the medicine section of CPT®: ordered and reviewed  Decide to obtain previous medical records or to obtain history from someone other than the patient: yes  Obtain history from someone other than the patient: yes  Review and summarize past medical records: yes  Independent visualization of images, tracings, or specimens: yes    Risk of Complications, Morbidity, and/or Mortality  Presenting problems: high  Diagnostic procedures: high  Management options: high      ED Course       Procedures  4:52 PM  Patient continues to feel SOB, especially upon exertion. He does not feel safe to go home. Will admit to Hospitalist service for further treatment of COPD.     Trop is 0.07  BNP slightly elevated    CXR is clear    CONSULT NOTE:  4:55 PM Evelyn Jacinto MD spoke with Dr. Blossom Harris, Consult for Hospitalist.  Discussed available diagnostic tests and clinical findings. He will admit the patient.

## 2017-02-02 NOTE — ED TRIAGE NOTES
SOB x 2 weeks. Hx of COPD. Constant NC/2L at home. EMS reported O2 sats 68-70s. Arrived on CPAP. 10 dexadron EMS; 18 Left bicep.

## 2017-02-02 NOTE — IP AVS SNAPSHOT
Summary of Care Report The Summary of Care report has been created to help improve care coordination. Users with access to [email or web address] or [address] (Web-based application) may access additional patient information including the Discharge Summary. If you are not currently a [address] user and need more information, please call the number listed below in the [place] [address] section and ask to be connected with Medical Records. Facility Information Name Address Phone [address] [phone] Patient Information Patient Name Sex  Ramona Rasmussen. ([id number]) Male 1955 Discharge Information Admitting Provider Service Area Unit [facility] / [address] [place] [address] 4 Post Surg Ort  [phone] Discharge Provider Discharge Date/Time Discharge Disposition Destination (none) 2017 (Pending) AHR (none) Patient Language Language ENGLISH [13] Problem List as of 2017  Date Reviewed: 2017 Codes Priority Class Noted - Resolved Chronic respiratory insufficiency ICD-10-CM: R06.89 
ICD-9-CM: 786.09   Unknown - Present GERD (gastroesophageal reflux disease) ICD-10-CM: K21.9 ICD-9-CM: 530.81   Unknown - Present Hypercholesteremia ICD-10-CM: E78.00 ICD-9-CM: 272.0   Unknown - Present COPD (chronic obstructive pulmonary disease) (HCC) ICD-10-CM: J44.9 ICD-9-CM: 496   Unknown - Present Hypothyroidism ICD-10-CM: E03.9 ICD-9-CM: 244.9   Unknown - Present Chronic pain due to injury ICD-10-CM: G89.21 ICD-9-CM: 338.21   Unknown - Present Acute on chronic respiratory failure with hypoxia and hypercapnia (HCC) ICD-10-CM: J96.21, J96.22 
ICD-9-CM: 518.84, 786.09, 799.02   2013 - Present Narcotic overdose ICD-10-CM: Earla Plump ICD-9-CM: 967.9, E980.2   2/22/2013 - Present Hypercapnemia ICD-10-CM: R06.89 
ICD-9-CM: 786.09   2/22/2013 - Present COPD with acute exacerbation ([facility] ICD-10-CM: J44.1 ICD-9-CM: 491.21   6/14/2016 - Present Chronic back pain greater than 3 months duration ICD-10-CM: M54.9, G89.29 ICD-9-CM: 724.5, 338.29   6/15/2016 - Present Chronic pain of both knees ICD-10-CM: M25.561, M25.562, G89.29 ICD-9-CM: 719.46, 338.29   6/15/2016 - Present RESOLVED: Acute metabolic encephalopathy GABRIELA-06-LD: G93.41 
ICD-9-CM: 348.31   12/16/2016 - 12/20/2016 Depression with anxiety ICD-10-CM: F41.8 ICD-9-CM: 300.4   12/16/2016 - Present Acute respiratory failure ([facility] ICD-10-CM: J96.00 
ICD-9-CM: 518.81   12/26/2016 - Present Acute on chronic respiratory failure (HCC) ICD-10-CM: J96.20 ICD-9-CM: 518.84   12/26/2016 - Present Elevated troponin ICD-10-CM: R79.89 ICD-9-CM: 790.6   12/26/2016 - Present Anisocoria ICD-10-CM: H57.02 
ICD-9-CM: 379.41   12/29/2016 - Present History of Bell's palsy (Chronic) ICD-10-CM: L19.79 
ICD-9-CM: V12.49   12/29/2016 - Present Overview Signed 12/29/2016  4:00 PM by Da Villanueva NP  
  R bell's palsy hx NSTEMI (non-ST elevated myocardial infarction) ([facility] ICD-10-CM: I21.4 ICD-9-CM: 410.70   1/23/2017 - Present COPD exacerbation ([facility] ICD-10-CM: J44.1 ICD-9-CM: 491.21   2/2/2017 - Present You are allergic to the following Allergen Reactions Tramadol Anxiety Current Discharge Medication List  
  
START taking these medications Dose & Instructions Dispensing Information Comments  
 guaiFENesin  mg ER tablet Commonly known as:  Davi & Davi Dose:  600 mg Take 1 Tab by mouth two (2) times a day for 5 days. Quantity:  10 Tab Refills:  0  
   
 levoFLOXacin 750 mg tablet Commonly known as:  Dulce Peña Dose:  750 mg Take 1 Tab by mouth daily for 4 days. Quantity:  4 Tab Refills:  0 CONTINUE these medications which have CHANGED Dose & Instructions Dispensing Information Comments  
 predniSONE 20 mg tablet Commonly known as:  Nidhi Mons What changed:   
- medication strength 
- how much to take 
- how to take this - when to take this 
- additional instructions Please take 60mg x 2 days then 40mg x 2 days then 20mg x 2 days then 10mg x 2 days then 5mg x 2 days Quantity:  14 Tab Refills:  0 CONTINUE these medications which have NOT CHANGED Dose & Instructions Dispensing Information Comments * albuterol 2.5 mg /3 mL (0.083 %) nebulizer solution Commonly known as:  PROVENTIL VENTOLIN Dose:  2.5 mg  
2.5 mg by Nebulization route four (4) times daily as needed. Refills:  0  
   
 * albuterol 90 mcg/actuation inhaler Commonly known as:  PROVENTIL HFA, VENTOLIN HFA, PROAIR HFA Dose:  2 Puff Take 2 Puffs by inhalation every four (4) hours as needed for Wheezing. Refills:  0  
   
 budesonide-formoterol 160-4.5 mcg/actuation HFA inhaler Commonly known as:  SYMBICORT Dose:  2 Puff Take 2 Puffs by inhalation two (2) times a day. Refills:  0  
   
 busPIRone 10 mg tablet Commonly known as:  BUSPAR Dose:  20 mg Take 20 mg by mouth three (3) times daily. Refills:  0  
   
 cholecalciferol 1,000 unit tablet Commonly known as:  VITAMIN D3 Dose:  1000 Units Take 1,000 Units by mouth daily (with lunch). Refills:  0  
   
 CYMBALTA 30 mg capsule Generic drug:  DULoxetine Dose:  90 mg Take 90 mg by mouth daily. Refills:  0  
   
 fluticasone 50 mcg/actuation nasal spray Commonly known as:  Hartford Bridgett Dose:  1 Rulo 1 Rulo by Both Nostrils route daily. Refills:  0  
   
 gabapentin 300 mg tablet Commonly known as:  NEURONTIN Dose:  300 mg Take 300 mg by mouth every six (6) hours. Refills:  0  
   
 labetalol 100 mg tablet Commonly known as:  Naomi Gala  
 Dose:  100 mg Take 1 Tab by mouth two (2) times a day. Quantity:  60 Tab Refills:  0  
   
 levothyroxine 50 mcg tablet Commonly known as:  SYNTHROID Dose:  50 mcg Take 1 Tab by mouth Daily (before breakfast). Quantity:  30 Tab Refills:  0  
   
 loratadine 10 mg tablet Commonly known as:  Aubree Heber Dose:  10 mg Take 10 mg by mouth daily. Refills:  0  
   
 multivitamin with iron tablet Dose:  1 Tab Take 1 Tab by mouth daily (with lunch). Refills:  0  
   
 oxyCODONE IR 5 mg immediate release tablet Commonly known as:  Danne Copper Dose:  5 mg Take 1 Tab by mouth every four (4) hours as needed for Pain. Max Daily Amount: 30 mg.  
 Quantity:  20 Tab Refills:  0  
   
 senna-docusate 8.6-50 mg per tablet Commonly known as:  Aliyah Tena Dose:  1 Tab Take 1 Tab by mouth two (2) times a day. Quantity:  60 Tab Refills:  1 SPIRIVA WITH HANDIHALER 18 mcg inhalation capsule Generic drug:  tiotropium Dose:  1 Cap Take 1 Cap by inhalation nightly. Refills:  0  
   
 vardenafil 20 mg tablet Commonly known as:  LEVITRA Dose:  20 mg Take 20 mg by mouth as needed. One tab one hr prior to activity. Max twice/month Refills:  0 ZOCOR 20 mg tablet Generic drug:  simvastatin Dose:  10 mg Take 10 mg by mouth nightly. Refills:  0  
   
 * Notice: This list has 2 medication(s) that are the same as other medications prescribed for you. Read the directions carefully, and ask your doctor or other care provider to review them with you. Follow-up Information Follow up With Details Comments Contact Info Phys Other, MD   Patient can only remember the practice name and not the physician Discharge Instructions HOSPITALIST DISCHARGE INSTRUCTIONS 
NAME: Anna Bullock. :  1955 MRN:  [id number] Date/Time:  2017 10:16 AM 
 
ADMIT DATE: 2017 DISCHARGE DATE: 2017 ADMITTING DIAGNOSIS: 
COPD exacerbation DISCHARGE DIAGNOSIS: 
As above MEDICATIONS: 
  
· It is important that you take the medication exactly as they are prescribed. · Keep your medication in the bottles provided by the pharmacist and keep a list of the medication names, dosages, and times to be taken in your wallet. · Do not take other medications without consulting your doctor. Pain Management: per above medications What to do at [facility] Recommended diet:  Regular Diet Recommended activity: Activity as tolerated If you experience any of the following symptoms then please call your primary care physician or return to the emergency room if you cannot get hold of your doctor: 
Fever, chills, nausea, vomiting, diarrhea, change in mentation, falling, bleeding, shortness of breath, chest pain Follow Up: 
Please follow-up at the [place] in 1-2 weeks Information obtained by : 
I understand that if any problems occur once I am at home I am to contact my physician. I understand and acknowledge receipt of the instructions indicated above. Physician's or R.N.'s Signature                                                                  Date/Time Patient or Representative Signature                                                          Date/Time Chart Review Routing History Recipient Method Report Sent By Mckay Marquis MD  
Fax: [phone] Phone: [phone] Fax Jennifer Almanzar MD NOTES AUTO ROUTING REPORT Zac Thomas MD [[id number]] 6/14/2016  8:42 PM 06/14/2016 Davion Marquis MD  
Fax: [phone] Phone: [phone] Fax [facility] MD NOTES AUTO ROUTING REPORT Lexa Pineda MD [[id number]] 6/17/2016 10:38 AM 06/17/2016 Thaddeus Jordan MD  
Fax: [phone] Phone: [phone] Fax Michi Munguia MD NOTES AUTO ROUTING REPORT Federico Reid MD [[id number]] 12/14/2016 10:21 PM 12/14/2016 Thaddeus Jordan MD  
Fax: [phone] Phone: [phone] Fax Michi Munguia MD NOTES AUTO ROUTING REPORT Federico Reid MD [[id number]] 12/14/2016 10:23 PM 12/14/2016 Thaddeus Jordan MD  
Fax: [phone] Phone: [phone] Fax [facility] MD NOTES AUTO ROUTING REPORT Federico Reid MD [[id number]] 12/15/2016  4:37 AM 12/15/2016 Thaddeus Jordan MD  
Fax: [phone] Phone: [phone] Fax Michi Munguia MD NOTES AUTO ROUTING REPORT Socorro Harmon MD [[id number]] 12/20/2016  4:46 PM 12/20/2016 Thaddeus Jordan MD  
Fax: [phone] Phone: [phone] Fax [address] IP MD NOTES AUTO ROUTING REPORT Madhu Bassett MD [[id number]] 12/26/2016  2:39 PM 12/26/2016 Thaddeus Jordan MD  
Fax: [phone] Phone: [phone] Fax Michi Munguia MD NOTES AUTO ROUTING REPORT Lexa Pinead MD [[id number]] 12/30/2016  2:48 PM 12/30/2016 Thaddeus Jordan MD  
Fax: [phone] Phone: [phone] Fax Michi Munguia MD NOTES AUTO ROUTING REPORT [address] [[id number]] 12/31/2016  2:19 PM 12/31/2016 Thaddeus Jordan MD  
Fax: [phone] Phone: [phone] Fax [facility] IP MD NOTES AUTO ROUTING REPORT 14 Luis A Mcneill MD [[id number]] 1/6/2017 10:38 PM 01/06/2017

## 2017-02-02 NOTE — PROGRESS NOTES
Shift Report:    9493: [facility] with hypoxia. Placed on CPAP en route because O2 sats were in the low 60s. Patient is currently on 2L NC with sats in low 90s.    1800: Patient arrived to unit via stretcher with ER tech. Placed into bed and attached to monitor. Patient requesting dinner tray however no diet orders at this time. Will contact Dr. Chris Wayne. 1850: Completed admission requirement documentation. Patient informed that he will be on isolation precautions until culture is resulted. 1915: Primary Nurse Mark Lombardi RN and Regi Peterson RN performed a dual skin assessment on this patient No impairment noted. Luis score is 23. Bedside and Verbal shift change report given to Regi Peterson RN (oncoming nurse) by Quinn Antonio RN (offgoing nurse). Report included the following information SBAR, Kardex, MAR, Accordion and Recent Results.

## 2017-02-02 NOTE — ED NOTES
TRANSFER - OUT REPORT:    Verbal report given to Marquise Lujan (name) on Anand Enciso.  being transferred to [facility] (unit) for routine progression of care       Report consisted of patients Situation, Background, Assessment and   Recommendations(SBAR). Information from the following report(s) SBAR, ED Summary, Intake/Output, MAR and Recent Results was reviewed with the receiving nurse. Lines:   Peripheral IV 02/02/17 Left Arm (Active)   Site Assessment Clean, dry, & intact 2/2/2017  5:08 PM   Phlebitis Assessment 0 2/2/2017  5:08 PM   Dressing Status Clean, dry, & intact 2/2/2017  5:08 PM   Dressing Type Transparent 2/2/2017  5:08 PM   Hub Color/Line Status Green;Flushed 2/2/2017  5:08 PM   Action Taken Blood drawn 2/2/2017  5:08 PM        Opportunity for questions and clarification was provided.       Patient transported with:   O2 @ 2 liters   paramedic

## 2017-02-02 NOTE — PROGRESS NOTES
[facility]: MED RECONCILIATION    Comments/Recommendations:     Medications added:     · None    Medications removed:    · Wellbutrin - patient no longer takes    Medications adjusted:    · Predinose taper completed, patient is now on chronic prednisone 10mg po daily    Information obtained from: Patient    Significant PMH/Disease States:   Past Medical History   Diagnosis Date    Anxiety     Chronic pain due to injury     Chronic respiratory insufficiency     COPD (chronic obstructive pulmonary disease) ([facility])     Depression     GERD (gastroesophageal reflux disease)     History of Bell's palsy 12/29/2016    Hypercholesteremia     Hypothyroid      Chief Complaint for this Admission:   Chief Complaint   Patient presents with    Shortness of Breath     Allergies: Tramadol    Prior to Admission Medications:     Medication Documentation Review Audit       Reviewed by Jeanie Castro (Pharmacist) on 02/02/17 at 1825         Medication Sig Documenting Provider Last Dose Status Taking? albuterol (PROVENTIL HFA, VENTOLIN HFA, PROAIR HFA) 90 mcg/actuation inhaler Take 2 Puffs by inhalation every four (4) hours as needed for Wheezing. Historical Provider 2/2/2017 1:30pm Active Yes    albuterol (PROVENTIL VENTOLIN) 2.5 mg /3 mL (0.083 %) nebulizer solution 2.5 mg by Nebulization route four (4) times daily as needed. Wendi Sanchez MD 2/2/2017 1:30pm Active Yes    budesonide-formoterol (SYMBICORT) 160-4.5 mcg/actuation HFA inhaler Take 2 Puffs by inhalation two (2) times a day. Historical Provider 2/2/2017 am Active Yes    busPIRone (BUSPAR) 10 mg tablet Take 20 mg by mouth three (3) times daily. Wendi Sanchez MD 2/2/2017 12 noon Active Yes    cholecalciferol (VITAMIN D3) 1,000 unit tablet Take 1,000 Units by mouth daily (with lunch). Historical Provider 2/2/2017 12 noon Active Yes    DULoxetine (CYMBALTA) 30 mg capsule Take 90 mg by mouth daily.  Historical Provider 2/2/2017 am Active Yes    fluticasone (FLONASE) 50 mcg/actuation nasal spray 1 [place] by Both Nostrils route daily. Historical Provider 2/2/2017 am Active Yes    gabapentin (NEURONTIN) 300 mg tablet Take 300 mg by mouth every six (6) hours. Wendi Sanchez MD 2/2/2017 12 noon Active Yes    labetalol (NORMODYNE) 100 mg tablet Take 1 Tab by mouth two (2) times a day. Juliann Velasco MD 2/2/2017 am Active Yes    levothyroxine (SYNTHROID) 50 mcg tablet Take 1 Tab by mouth Daily (before breakfast). Juliann Velasco MD 2/2/2017 am Active Yes    loratadine (CLARITIN) 10 mg tablet Take 10 mg by mouth daily. Historical Provider 2/2/2017 am Active Yes    multivitamin with iron tablet Take 1 Tab by mouth daily (with lunch). Wendi Sanchez MD 2/2/2017 12 noon Active Yes    oxyCODONE IR (ROXICODONE) 5 mg immediate release tablet Take 1 Tab by mouth every four (4) hours as needed for Pain. Max Daily Amount: 30 mg. Juliann Velasco MD  Active No    predniSONE (DELTASONE) 10 mg tablet Take 10 mg by mouth daily (with breakfast). Historical Provider 2/2/2017 am Active Yes    senna-docusate (PERICOLACE) 8.6-50 mg per tablet Take 1 Tab by mouth two (2) times a day. Juliann Velasco MD 2/2/2017 am Active Yes    simvastatin (ZOCOR) 20 mg tablet Take 10 mg by mouth nightly. Wendi Sanchez MD 2/1/2017 pm Active Yes    tiotropium (SPIRIVA WITH HANDIHALER) 18 mcg inhalation capsule Take 1 Cap by inhalation nightly. Wendi Sanchez MD 2/1/2017 pm Active Yes    vardenafil (LEVITRA) 20 mg tablet Take 20 mg by mouth as needed. One tab one hr prior to activity.  Max twice/month Wendi Sanchez MD a couple years ago [address] [place] [address]: [phone]

## 2017-02-02 NOTE — IP AVS SNAPSHOT
[facility] 
 
 
 [address] [place] [address] 
[phone] Patient: Vito Ferreira. MRN: [id number] EYD:4/0/[birth year]137 You are allergic to the following Allergen Reactions Tramadol Anxiety Recent Documentation Height Weight BMI Smoking Status 1.778 m 114.3 kg 36.16 kg/m2 Former Smoker Emergency Contacts Name Discharge Info Relation Home Work Mobile [address] [facility] CAREGIVER [3] Parent [1] [phone] About your hospitalization You were admitted on:  February 2, 2017 You last received care in the:  [facility] 4M POST SURG ORT 1 You were discharged on:  February 4, 2017 Unit phone number:  [phone] Why you were hospitalized Your primary diagnosis was:  Not on File Your diagnoses also included:  Copd Exacerbation (Hcc), Acute On Chronic Respiratory Failure (Hcc), Elevated Troponin, Depression With Anxiety, Hypothyroidism, Chronic Pain Of Both Knees Providers Seen During Your Hospitalizations Provider Role Specialty Primary office phone Evelyn Jacinto MD Attending Provider Emergency Medicine [phone] Danielle Perry DO Attending Provider Internal Medicine [phone] Karen Joyce MD Attending Provider Internal Medicine [phone] Your Primary Care Physician (PCP) Primary Care Physician Office Phone Office Fax OTHER, PHYS ** None ** ** None ** Follow-up Information Follow up With Details Comments Contact Info Wendi Sanchez MD   Patient can only remember the practice name and not the physician Your Appointments Tuesday February 14, 2017  8:30 AM EST  
NUCLEAR MEDICINE with NUCLEARALBERTO  
[facility] (HOPE SCHEDULING) [address] [place] [address] [address] [place] [address] Road  
[phone] Wednesday February 15, 2017  8:30 AM EST NUCLEAR MEDICINE with ALBERTO WEEKS  
[facility] (HOPE SCHEDULING) [address] [address] 100[address] [facility]  
[phone] Current Discharge Medication List  
  
START taking these medications Dose & Instructions Dispensing Information Comments Morning Noon Evening Bedtime  
 guaiFENesin  mg ER tablet Commonly known as:  Davi & Davi Your next dose is: Today, Tomorrow Other:  _________ Dose:  600 mg Take 1 Tab by mouth two (2) times a day for 5 days. Quantity:  10 Tab Refills:  0  
     
   
   
   
  
 levoFLOXacin 750 mg tablet Commonly known as:  Wilda Noel Your next dose is: Today, Tomorrow Other:  _________ Dose:  750 mg Take 1 Tab by mouth daily for 4 days. Quantity:  4 Tab Refills:  0 CONTINUE these medications which have CHANGED Dose & Instructions Dispensing Information Comments Morning Noon Evening Bedtime  
 predniSONE 20 mg tablet Commonly known as:  Alpa Bright What changed:   
- medication strength 
- how much to take 
- how to take this - when to take this 
- additional instructions Your next dose is: Today, Tomorrow Other:  _________ Please take 60mg x 2 days then 40mg x 2 days then 20mg x 2 days then 10mg x 2 days then 5mg x 2 days Quantity:  14 Tab Refills:  0 CONTINUE these medications which have NOT CHANGED Dose & Instructions Dispensing Information Comments Morning Noon Evening Bedtime * albuterol 2.5 mg /3 mL (0.083 %) nebulizer solution Commonly known as:  PROVENTIL VENTOLIN Your next dose is: Today, Tomorrow Other:  _________ Dose:  2.5 mg  
2.5 mg by Nebulization route four (4) times daily as needed. Refills:  0  
     
   
   
   
  
 * albuterol 90 mcg/actuation inhaler Commonly known as:  PROVENTIL HFA, VENTOLIN HFA, PROAIR HFA Your next dose is: Today, Tomorrow Other:  _________ Dose:  2 Puff Take 2 Puffs by inhalation every four (4) hours as needed for Wheezing. Refills:  0  
     
   
   
   
  
 budesonide-formoterol 160-4.5 mcg/actuation HFA inhaler Commonly known as:  SYMBICORT Your next dose is: Today, Tomorrow Other:  _________ Dose:  2 Puff Take 2 Puffs by inhalation two (2) times a day. Refills:  0  
     
   
   
   
  
 busPIRone 10 mg tablet Commonly known as:  BUSPAR Your next dose is: Today, Tomorrow Other:  _________ Dose:  20 mg Take 20 mg by mouth three (3) times daily. Refills:  0  
     
   
   
   
  
 cholecalciferol 1,000 unit tablet Commonly known as:  VITAMIN D3 Your next dose is: Today, Tomorrow Other:  _________ Dose:  1000 Units Take 1,000 Units by mouth daily (with lunch). Refills:  0  
     
   
   
   
  
 CYMBALTA 30 mg capsule Generic drug:  DULoxetine Your next dose is: Today, Tomorrow Other:  _________ Dose:  90 mg Take 90 mg by mouth daily. Refills:  0  
     
   
   
   
  
 fluticasone 50 mcg/actuation nasal spray Commonly known as:  Heike Finely Your next dose is: Today, Tomorrow Other:  _________ Dose:  1 Spray 1 [place] by Both Nostrils route daily. Refills:  0  
     
   
   
   
  
 gabapentin 300 mg tablet Commonly known as:  NEURONTIN Your next dose is: Today, Tomorrow Other:  _________ Dose:  300 mg Take 300 mg by mouth every six (6) hours. Refills:  0  
     
   
   
   
  
 labetalol 100 mg tablet Commonly known as:  Lamar Liming Your next dose is: Today, Tomorrow Other:  _________ Dose:  100 mg Take 1 Tab by mouth two (2) times a day. Quantity:  60 Tab Refills:  0 levothyroxine 50 mcg tablet Commonly known as:  SYNTHROID Your next dose is: Today, Tomorrow Other:  _________ Dose:  50 mcg Take 1 Tab by mouth Daily (before breakfast). Quantity:  30 Tab Refills:  0  
     
   
   
   
  
 loratadine 10 mg tablet Commonly known as:  Shellye Drape Your next dose is: Today, Tomorrow Other:  _________ Dose:  10 mg Take 10 mg by mouth daily. Refills:  0  
     
   
   
   
  
 multivitamin with iron tablet Your next dose is: Today, Tomorrow Other:  _________ Dose:  1 Tab Take 1 Tab by mouth daily (with lunch). Refills:  0  
     
   
   
   
  
 oxyCODONE IR 5 mg immediate release tablet Commonly known as:  Royetta Renetta Your next dose is: Today, Tomorrow Other:  _________ Dose:  5 mg Take 1 Tab by mouth every four (4) hours as needed for Pain. Max Daily Amount: 30 mg.  
 Quantity:  20 Tab Refills:  0  
     
   
   
   
  
 senna-docusate 8.6-50 mg per tablet Commonly known as:  Dexter Jayce Your next dose is: Today, Tomorrow Other:  _________ Dose:  1 Tab Take 1 Tab by mouth two (2) times a day. Quantity:  60 Tab Refills:  1 SPIRIVA WITH HANDIHALER 18 mcg inhalation capsule Generic drug:  tiotropium Your next dose is: Today, Tomorrow Other:  _________ Dose:  1 Cap Take 1 Cap by inhalation nightly. Refills:  0  
     
   
   
   
  
 vardenafil 20 mg tablet Commonly known as:  LEVITRA Your next dose is: Today, Tomorrow Other:  _________ Dose:  20 mg Take 20 mg by mouth as needed. One tab one hr prior to activity. Max twice/month Refills:  0 ZOCOR 20 mg tablet Generic drug:  simvastatin Your next dose is: Today, Tomorrow Other:  _________ Dose:  10 mg Take 10 mg by mouth nightly. Refills:  0 * Notice: This list has 2 medication(s) that are the same as other medications prescribed for you. Read the directions carefully, and ask your doctor or other care provider to review them with you. Where to Get Your Medications Information on where to get these meds will be given to you by the nurse or doctor. ! Ask your nurse or doctor about these medications  
  guaiFENesin  mg ER tablet  
 levoFLOXacin 750 mg tablet  
 predniSONE 20 mg tablet Discharge Instructions HOSPITALIST DISCHARGE INSTRUCTIONS 
NAME: Rachel Valladares :  1955 MRN:  [id number] Date/Time:  2017 10:16 AM 
 
ADMIT DATE: 2017 DISCHARGE DATE: 2017 ADMITTING DIAGNOSIS: 
COPD exacerbation DISCHARGE DIAGNOSIS: 
As above MEDICATIONS: 
  
· It is important that you take the medication exactly as they are prescribed. · Keep your medication in the bottles provided by the pharmacist and keep a list of the medication names, dosages, and times to be taken in your wallet. · Do not take other medications without consulting your doctor. Pain Management: per above medications What to do at [facility] Recommended diet:  Regular Diet Recommended activity: Activity as tolerated If you experience any of the following symptoms then please call your primary care physician or return to the emergency room if you cannot get hold of your doctor: 
Fever, chills, nausea, vomiting, diarrhea, change in mentation, falling, bleeding, shortness of breath, chest pain Follow Up: 
Please follow-up at the [place] in 1-2 weeks Information obtained by : 
I understand that if any problems occur once I am at home I am to contact my physician. I understand and acknowledge receipt of the instructions indicated above. Physician's or R.N.'s Signature                                                                  Date/Time Patient or Representative Signature                                                          Date/Time Discharge Orders None [employer] Announcement We are excited to announce that we are making your provider's discharge notes available to you in [employer]. You will see these notes when they are completed and signed by the physician that discharged you from your recent hospital stay. If you have any questions or concerns about any information you see in [employer], please call the Health Information Department where you were seen or reach out to your Primary Care Provider for more information about your plan of care. Introducing [facility]! Lulu Mariolaraymond introduces [employer] patient portal. Now you can access parts of your medical record, email your doctor's office, and request medication refills online. 1. In your internet browser, go to [email or web address] 2. Click on the First Time User? Click Here link in the Sign In box. You will see the New Member Sign Up page. 3. Enter your [employer] Access Code exactly as it appears below. You will not need to use this code after youve completed the sign-up process. If you do not sign up before the expiration date, you must request a new code. · [employer] Access Code: [email or web address] Expires: 3/19/2017  3:25 PM 
 
4. Enter the last four digits of your Social Security Number (xxxx) and Date of Birth (mm/dd/yyyy) as indicated and click Submit. You will be taken to the next sign-up page. 5. Create a Dokogeot ID. This will be your [employer]ok[employer] login ID and cannot be changed, so think of one that is secure and easy to remember. 6. Create a [employer] password. You can change your password at any time. 7. Enter your Password Reset Question and Answer. This can be used at a later time if you forget your password. 8. Enter your e-mail address. You will receive e-mail notification when new information is available in [address] 9. Click Sign Up. You can now view and download portions of your medical record. 10. Click the Download Summary menu link to download a portable copy of your medical information. If you have questions, please visit the Frequently Asked Questions section of the [employer] website. Remember, [employer] is NOT to be used for urgent needs. For medical emergencies, dial 911. Now available from your iPhone and Android! General Information Please provide this summary of care documentation to your next provider. Patient Signature:  ____________________________________________________________ Date:  ____________________________________________________________  
  
Ashley Beltran Provider Signature:  ____________________________________________________________ Date:  ____________________________________________________________

## 2017-02-03 LAB
ANION GAP BLD CALC-SCNC: 4 MMOL/L (ref 5–15)
APPEARANCE UR: CLEAR
BACTERIA SPEC CULT: NORMAL
BACTERIA URNS QL MICRO: NEGATIVE /HPF
BASOPHILS # BLD AUTO: 0 K/UL (ref 0–0.1)
BASOPHILS # BLD: 0 % (ref 0–1)
BILIRUB UR QL: NEGATIVE
BUN SERPL-MCNC: 13 MG/DL (ref 6–20)
BUN/CREAT SERPL: 20 (ref 12–20)
CALCIUM SERPL-MCNC: 8.8 MG/DL (ref 8.5–10.1)
CHLORIDE SERPL-SCNC: 103 MMOL/L (ref 97–108)
CO2 SERPL-SCNC: 37 MMOL/L (ref 21–32)
COLOR UR: ABNORMAL
CREAT SERPL-MCNC: 0.64 MG/DL (ref 0.7–1.3)
EOSINOPHIL # BLD: 0 K/UL (ref 0–0.4)
EOSINOPHIL NFR BLD: 0 % (ref 0–7)
EPITH CASTS URNS QL MICRO: ABNORMAL /LPF
ERYTHROCYTE [DISTWIDTH] IN BLOOD BY AUTOMATED COUNT: 13.4 % (ref 11.5–14.5)
GLUCOSE SERPL-MCNC: 141 MG/DL (ref 65–100)
GLUCOSE UR STRIP.AUTO-MCNC: 500 MG/DL
HCT VFR BLD AUTO: 33.5 % (ref 36.6–50.3)
HGB BLD-MCNC: 10.4 G/DL (ref 12.1–17)
HGB UR QL STRIP: NEGATIVE
HYALINE CASTS URNS QL MICRO: ABNORMAL /LPF (ref 0–5)
KETONES UR QL STRIP.AUTO: NEGATIVE MG/DL
LEUKOCYTE ESTERASE UR QL STRIP.AUTO: NEGATIVE
LYMPHOCYTES # BLD AUTO: 7 % (ref 12–49)
LYMPHOCYTES # BLD: 0.5 K/UL (ref 0.8–3.5)
MAGNESIUM SERPL-MCNC: 1.9 MG/DL (ref 1.6–2.4)
MCH RBC QN AUTO: 29.5 PG (ref 26–34)
MCHC RBC AUTO-ENTMCNC: 31 G/DL (ref 30–36.5)
MCV RBC AUTO: 95.2 FL (ref 80–99)
MONOCYTES # BLD: 0.1 K/UL (ref 0–1)
MONOCYTES NFR BLD AUTO: 1 % (ref 5–13)
NEUTS SEG # BLD: 7.1 K/UL (ref 1.8–8)
NEUTS SEG NFR BLD AUTO: 92 % (ref 32–75)
NITRITE UR QL STRIP.AUTO: NEGATIVE
PH UR STRIP: 7.5 [PH] (ref 5–8)
PHOSPHATE SERPL-MCNC: 3 MG/DL (ref 2.6–4.7)
PLATELET # BLD AUTO: 255 K/UL (ref 150–400)
POTASSIUM SERPL-SCNC: 3.8 MMOL/L (ref 3.5–5.1)
PROT UR STRIP-MCNC: NEGATIVE MG/DL
RBC # BLD AUTO: 3.52 M/UL (ref 4.1–5.7)
RBC #/AREA URNS HPF: ABNORMAL /HPF (ref 0–5)
RBC MORPH BLD: ABNORMAL
SERVICE CMNT-IMP: NORMAL
SODIUM SERPL-SCNC: 144 MMOL/L (ref 136–145)
SP GR UR REFRACTOMETRY: 1.01 (ref 1–1.03)
T4 FREE SERPL-MCNC: 1 NG/DL (ref 0.8–1.5)
TROPONIN I SERPL-MCNC: 0.06 NG/ML
TSH SERPL DL<=0.05 MIU/L-ACNC: 0.06 UIU/ML (ref 0.36–3.74)
UA: UC IF INDICATED,UAUC: ABNORMAL
UROBILINOGEN UR QL STRIP.AUTO: 0.2 EU/DL (ref 0.2–1)
WBC # BLD AUTO: 7.7 K/UL (ref 4.1–11.1)
WBC URNS QL MICRO: ABNORMAL /HPF (ref 0–4)

## 2017-02-03 PROCEDURE — 85025 COMPLETE CBC W/AUTO DIFF WBC: CPT | Performed by: INTERNAL MEDICINE

## 2017-02-03 PROCEDURE — 36415 COLL VENOUS BLD VENIPUNCTURE: CPT | Performed by: INTERNAL MEDICINE

## 2017-02-03 PROCEDURE — 97165 OT EVAL LOW COMPLEX 30 MIN: CPT

## 2017-02-03 PROCEDURE — 97535 SELF CARE MNGMENT TRAINING: CPT

## 2017-02-03 PROCEDURE — 84484 ASSAY OF TROPONIN QUANT: CPT | Performed by: INTERNAL MEDICINE

## 2017-02-03 PROCEDURE — 84100 ASSAY OF PHOSPHORUS: CPT | Performed by: INTERNAL MEDICINE

## 2017-02-03 PROCEDURE — 84439 ASSAY OF FREE THYROXINE: CPT | Performed by: INTERNAL MEDICINE

## 2017-02-03 PROCEDURE — 84443 ASSAY THYROID STIM HORMONE: CPT | Performed by: INTERNAL MEDICINE

## 2017-02-03 PROCEDURE — 65660000000 HC RM CCU STEPDOWN

## 2017-02-03 PROCEDURE — 77030013140 HC MSK NEB VYRM -A

## 2017-02-03 PROCEDURE — 97161 PT EVAL LOW COMPLEX 20 MIN: CPT

## 2017-02-03 PROCEDURE — 83735 ASSAY OF MAGNESIUM: CPT | Performed by: INTERNAL MEDICINE

## 2017-02-03 PROCEDURE — 97116 GAIT TRAINING THERAPY: CPT

## 2017-02-03 PROCEDURE — 74011000250 HC RX REV CODE- 250: Performed by: INTERNAL MEDICINE

## 2017-02-03 PROCEDURE — 74011250637 HC RX REV CODE- 250/637: Performed by: INTERNAL MEDICINE

## 2017-02-03 PROCEDURE — 94640 AIRWAY INHALATION TREATMENT: CPT

## 2017-02-03 PROCEDURE — 77010033678 HC OXYGEN DAILY

## 2017-02-03 PROCEDURE — 74011250636 HC RX REV CODE- 250/636: Performed by: PHYSICIAN ASSISTANT

## 2017-02-03 PROCEDURE — 74011250636 HC RX REV CODE- 250/636: Performed by: INTERNAL MEDICINE

## 2017-02-03 PROCEDURE — 80048 BASIC METABOLIC PNL TOTAL CA: CPT | Performed by: INTERNAL MEDICINE

## 2017-02-03 RX ORDER — FLUTICASONE FUROATE AND VILANTEROL 200; 25 UG/1; UG/1
1 POWDER RESPIRATORY (INHALATION) DAILY
Status: DISCONTINUED | OUTPATIENT
Start: 2017-02-04 | End: 2017-02-04 | Stop reason: HOSPADM

## 2017-02-03 RX ADMIN — Medication 10 ML: at 21:16

## 2017-02-03 RX ADMIN — FLUTICASONE PROPIONATE 1 SPRAY: 50 SPRAY, METERED NASAL at 08:47

## 2017-02-03 RX ADMIN — METHYLPREDNISOLONE SODIUM SUCCINATE 60 MG: 40 INJECTION, POWDER, FOR SOLUTION INTRAMUSCULAR; INTRAVENOUS at 04:00

## 2017-02-03 RX ADMIN — BUSPIRONE HYDROCHLORIDE 20 MG: 5 TABLET ORAL at 21:13

## 2017-02-03 RX ADMIN — LABETALOL HCL 100 MG: 100 TABLET, FILM COATED ORAL at 17:10

## 2017-02-03 RX ADMIN — AZITHROMYCIN MONOHYDRATE 500 MG: 500 INJECTION, POWDER, LYOPHILIZED, FOR SOLUTION INTRAVENOUS at 20:35

## 2017-02-03 RX ADMIN — IPRATROPIUM BROMIDE AND ALBUTEROL SULFATE 3 ML: .5; 2.5 SOLUTION RESPIRATORY (INHALATION) at 20:43

## 2017-02-03 RX ADMIN — METHYLPREDNISOLONE SODIUM SUCCINATE 60 MG: 125 INJECTION, POWDER, FOR SOLUTION INTRAMUSCULAR; INTRAVENOUS at 17:10

## 2017-02-03 RX ADMIN — IPRATROPIUM BROMIDE AND ALBUTEROL SULFATE 3 ML: .5; 2.5 SOLUTION RESPIRATORY (INHALATION) at 12:37

## 2017-02-03 RX ADMIN — HYDROCODONE BITARTRATE AND ACETAMINOPHEN 1 TABLET: 5; 325 TABLET ORAL at 16:28

## 2017-02-03 RX ADMIN — METHYLPREDNISOLONE SODIUM SUCCINATE 60 MG: 40 INJECTION, POWDER, FOR SOLUTION INTRAMUSCULAR; INTRAVENOUS at 09:40

## 2017-02-03 RX ADMIN — IPRATROPIUM BROMIDE AND ALBUTEROL SULFATE 3 ML: .5; 2.5 SOLUTION RESPIRATORY (INHALATION) at 04:20

## 2017-02-03 RX ADMIN — Medication 3 TABLET: at 08:49

## 2017-02-03 RX ADMIN — IPRATROPIUM BROMIDE AND ALBUTEROL SULFATE 3 ML: .5; 2.5 SOLUTION RESPIRATORY (INHALATION) at 17:05

## 2017-02-03 RX ADMIN — ZOLPIDEM TARTRATE 5 MG: 5 TABLET, FILM COATED ORAL at 06:00

## 2017-02-03 RX ADMIN — GABAPENTIN 300 MG: 300 CAPSULE ORAL at 06:33

## 2017-02-03 RX ADMIN — HYDROCODONE BITARTRATE AND ACETAMINOPHEN 1 TABLET: 5; 325 TABLET ORAL at 12:39

## 2017-02-03 RX ADMIN — FLUTICASONE FUROATE AND VILANTEROL TRIFENATATE 1 PUFF: 100; 25 POWDER RESPIRATORY (INHALATION) at 08:47

## 2017-02-03 RX ADMIN — ENOXAPARIN SODIUM 40 MG: 40 INJECTION SUBCUTANEOUS at 20:36

## 2017-02-03 RX ADMIN — BUSPIRONE HYDROCHLORIDE 20 MG: 5 TABLET ORAL at 08:49

## 2017-02-03 RX ADMIN — VITAMIN D, TAB 1000IU (100/BT) 1000 UNITS: 25 TAB at 08:49

## 2017-02-03 RX ADMIN — LEVOTHYROXINE SODIUM 50 MCG: 0.05 TABLET ORAL at 07:02

## 2017-02-03 RX ADMIN — LABETALOL HCL 100 MG: 100 TABLET, FILM COATED ORAL at 08:50

## 2017-02-03 RX ADMIN — HYDROCODONE BITARTRATE AND ACETAMINOPHEN 1 TABLET: 5; 325 TABLET ORAL at 07:02

## 2017-02-03 RX ADMIN — GABAPENTIN 300 MG: 300 CAPSULE ORAL at 00:29

## 2017-02-03 RX ADMIN — HYDROCODONE BITARTRATE AND ACETAMINOPHEN 1 TABLET: 5; 325 TABLET ORAL at 21:14

## 2017-02-03 RX ADMIN — IPRATROPIUM BROMIDE AND ALBUTEROL SULFATE 3 ML: .5; 2.5 SOLUTION RESPIRATORY (INHALATION) at 00:12

## 2017-02-03 RX ADMIN — LORATADINE 10 MG: 10 TABLET ORAL at 08:50

## 2017-02-03 RX ADMIN — GABAPENTIN 300 MG: 300 CAPSULE ORAL at 11:16

## 2017-02-03 RX ADMIN — Medication 10 ML: at 06:34

## 2017-02-03 RX ADMIN — BUSPIRONE HYDROCHLORIDE 20 MG: 5 TABLET ORAL at 16:28

## 2017-02-03 RX ADMIN — DULOXETINE HYDROCHLORIDE 90 MG: 30 CAPSULE, DELAYED RELEASE ORAL at 08:49

## 2017-02-03 RX ADMIN — IPRATROPIUM BROMIDE AND ALBUTEROL SULFATE 3 ML: .5; 2.5 SOLUTION RESPIRATORY (INHALATION) at 07:14

## 2017-02-03 RX ADMIN — GABAPENTIN 300 MG: 300 CAPSULE ORAL at 17:10

## 2017-02-03 RX ADMIN — SIMVASTATIN 10 MG: 5 TABLET, FILM COATED ORAL at 21:13

## 2017-02-03 RX ADMIN — GABAPENTIN 300 MG: 300 CAPSULE ORAL at 23:35

## 2017-02-03 NOTE — PROGRESS NOTES
2-3-2017 CASE MANAGEMENT NOTE:  I met with the pt to determine potential discharge needs. The pt stated his 25year old grand-son lives with him in his one story house with an entry ramp. His grand-son works but the pt's mother, Jed Kolb ([phone]), lives close-by. He is independent with his ADL's but drives on a very limited basis-mainly relies on his mother or grand-son for transportation. He has a cane, rollator, wheelchair, electric scooter, oxygen (does not remember name of supplier) and BiPaP at home. He is open to [facility] for RN,PT and OT services and will need an order to resume this. He goes to the [facility] at [facility] and gets most of his medications through them also. If necessary, he also uses the pharmacy at [facility] on [facility]. for medications. CM will follow and assist with discharge needs as identified. Care Management Interventions  PCP Verified by CM:  Yes ([address] [facility])  Discharge Durable Medical Equipment: No (Has a cane, rollator, wheelchair, electric scooter, oxygen and BiPaP)  Physical Therapy Consult: Yes  Occupational Therapy Consult: Yes  Current Support Network:  (24 year old grand-son lives with the pt)  Confirm Follow Up Transport: Family  Discharge Location  Discharge Placement:  (Home with [facility] continuing)    Francheska Antony, RHIANNA

## 2017-02-03 NOTE — CONSULTS
Name: Zaina Bolanos. Hospital: [facility] [address]   : 1955 Admit Date: 2017   Phone: [phone]  Room: 316/01   PCP: Emilia Ramires MD  MRN: [id number]   Date: 2/3/2017  Code: Full Code        HPI:    Chart and notes reviewed. Data reviewed. I review the patient's current medications in the medical record at each encounter. I have evaluated and examined the patient. 10:59 AM       History was obtained from the patient. I was asked by Markus Ngo DO to see Zaina Chang in consultation for a chief complaint of shortness of breath . History of Present Illness:  62 y/o male who presented for SOB. He says he completed a steroid taper at the beginning of January and since that time has felt that his breathing has not been quite normal. He started feeling more SOB about two weeks ago and was given guaifenesin by his doctor with little improvement. He has been using his nebulizer more frequently (6 times/day) and two days ago noticed his O2 sats were dropping into the 50s which prompted him to seek medical attention. He can typically walk around his house without feeling SOB, but now says he has only been able to walk 15 feet before becoming SOB. He has a sick contact at home as well. He c/o chest soreness but says this is typical for him during COPD exacerbations. He says he coughed once this morning and produced a small amount of green sputum, but otherwise is not having a cough. He uses his CPAP 4 hours each night. He uses 1.5-2 L O2 at home. This morning he is feeling better but does not think his breathing is back to his baseline yet. Becomes SOB with walking several feet to the bathroom in his room. Quit smoking in November. Is on lung transplant list.  Followed outpatient by pulmonary at the [place] - Dr. Campbell Lord    On arrival in the ED, O2 sats were 68-70s. He was placed on CPAP and given 10mg Decadron.     Of note, he was admitted twice in December for COPD exacerbations. PT note for today: On 2L O2: sats at rest 93%, with exertion 90%.     Images:  CXR: no acute findings        Past Medical History   Diagnosis Date    Anxiety     Chronic pain due to injury     Chronic respiratory insufficiency     COPD (chronic obstructive pulmonary disease) (HCC)     Depression     GERD (gastroesophageal reflux disease)     History of Bell's palsy 12/29/2016    Hypercholesteremia     Hypothyroid        Past Surgical History   Procedure Laterality Date    Hx orthopaedic       left leg, right hip, knee pelvis       Family History   Problem Relation Age of Onset    Cancer Mother     COPD Father     Cancer Father        Social History   Substance Use Topics    Smoking status: Former Smoker     Packs/day: 2.00    Smokeless tobacco: Not on file    Alcohol use No      Comment: none x 4-5 years       Allergies   Allergen Reactions    Tramadol Anxiety       Current Facility-Administered Medications   Medication Dose Route Frequency    [START ON 2/4/2017] fluticasone-vilanterol (BREO ELLIPTA) 200mcg-25mcg/puff  1 Puff Inhalation DAILY    sodium chloride (NS) flush 5-10 mL  5-10 mL IntraVENous Q8H    sodium chloride (NS) flush 5-10 mL  5-10 mL IntraVENous PRN    methylPREDNISolone (PF) (SOLU-MEDROL) injection 60 mg  60 mg IntraVENous Q6H    azithromycin (ZITHROMAX) 500 mg in 0.9% sodium chloride (MBP/ADV) 250 mL  500 mg IntraVENous Q24H    ondansetron (ZOFRAN) injection 4 mg  4 mg IntraVENous Q4H PRN    HYDROcodone-acetaminophen (NORCO) 5-325 mg per tablet 1 Tab  1 Tab Oral Q4H PRN    zolpidem (AMBIEN) tablet 5 mg  5 mg Oral QHS PRN    enoxaparin (LOVENOX) injection 40 mg  40 mg SubCUTAneous Q24H    labetalol (NORMODYNE) tablet 100 mg  100 mg Oral BID    levothyroxine (SYNTHROID) tablet 50 mcg  50 mcg Oral ACB    DULoxetine (CYMBALTA) capsule 90 mg  90 mg Oral DAILY    cholecalciferol (VITAMIN D3) tablet 1,000 Units  1,000 Units Oral DAILY    fluticasone (FLONASE) 50 mcg/actuation nasal spray 1 Spray  1 Spray Both Nostrils DAILY    loratadine (CLARITIN) tablet 10 mg  10 mg Oral DAILY    busPIRone (BUSPAR) tablet 20 mg  20 mg Oral TID    gabapentin (NEURONTIN) capsule 300 mg  300 mg Oral Q6H    senna-docusate (PERICOLACE) 8.6-50 mg per tablet 3 Tab  3 Tab Oral DAILY    simvastatin (ZOCOR) tablet 10 mg  10 mg Oral QHS    albuterol-ipratropium (DUO-NEB) 2.5 MG-0.5 MG/3 ML  3 mL Nebulization Q4H RT         REVIEW OF SYSTEMS   Negative except as stated in the HPI. Physical Exam:   Visit Vitals    /72 (BP 1 Location: Right arm, BP Patient Position: At rest)    Pulse 76    Temp 98.3 °F (36.8 °C)    Resp 18    Ht 5' 10\" (1.778 m)    Wt 114.3 kg (252 lb)    SpO2 95%    BMI 36.16 kg/m2       General:  Alert, cooperative, no distress, appears stated age. Head:  Normocephalic, without obvious abnormality, atraumatic. Eyes:  Conjunctivae/corneas clear. Nose: Nares normal. Septum midline. Mucosa normal.    Throat: Lips, mucosa, and tongue normal.    Neck: Supple, symmetrical, trachea midline. Lungs:   Clear to auscultation bilaterally. No wheezes, crackles, rhonchi. Speaking in full sentences. Chest wall:  No tenderness or deformity. Heart:  Regular rate and rhythm, S1, S2 normal, no murmur. Abdomen:   Soft, non-tender. Bowel sounds normal. No masses. Extremities: Extremities normal, atraumatic, no cyanosis or edema.    Skin: Skin color, texture, turgor normal. No rashes or lesions   Neurologic: Grossly nonfocal       Lab Results   Component Value Date/Time    Sodium 144 02/03/2017 04:14 AM    Potassium 3.8 02/03/2017 04:14 AM    Chloride 103 02/03/2017 04:14 AM    CO2 37 02/03/2017 04:14 AM    BUN 13 02/03/2017 04:14 AM    Creatinine 0.64 02/03/2017 04:14 AM    Glucose 141 02/03/2017 04:14 AM    Calcium 8.8 02/03/2017 04:14 AM    Magnesium 1.9 02/03/2017 04:14 AM    Phosphorus 3.0 02/03/2017 04:14 AM    Lactic acid 1.4 12/26/2016 11:46 AM Lab Results   Component Value Date/Time    WBC 7.7 02/03/2017 04:14 AM    HGB 10.4 02/03/2017 04:14 AM    PLATELET 717 50/99/6463 04:14 AM    MCV 95.2 02/03/2017 04:14 AM       Lab Results   Component Value Date/Time    AST (SGOT) 16 12/29/2016 04:06 AM    Alk. phosphatase 51 12/29/2016 04:06 AM    Protein, total 6.0 12/29/2016 04:06 AM    Albumin 2.8 12/29/2016 04:06 AM    Globulin 3.2 12/29/2016 04:06 AM       No results found for: IRON, FE, TIBC, IBCT, PSAT, FERR    Lab Results   Component Value Date/Time    TSH 0.06 02/03/2017 04:14 AM        No results found for: PH, PHI, PCO2, PCO2I, PO2, PO2I, HCO3, HCO3I, FIO2, FIO2I    Lab Results   Component Value Date/Time    CK 31 12/27/2016 02:24 AM    CK-MB Index 4.2 12/27/2016 02:24 AM    Troponin-I, Qt. 0.06 02/03/2017 04:14 AM        Lab Results   Component Value Date/Time    Culture result: NO GROWTH 1 DAY 12/30/2016 10:56 PM    Culture result: NO GROWTH 1 DAY 12/14/2016 10:32 PM    Culture result:  12/14/2016 07:22 PM     STAPHYLOCOCCUS EPIDERMIDIS  GROWING IN 1 OF 2 BOTTLES DRAWN  (SITE=L AC)      Culture result:  12/14/2016 07:22 PM     IDENTIFICATION AND SENSITIVITIES REQUESTED BY MARISSA PROCTOR 12/19/16 TA.     Culture result: REMAINING BOTTLE(S) HAS/HAVE NO GROWTH IN 5 DAYS 12/14/2016 07:22 PM       No results found for: TOXA1, RPR, HBCM, HBSAG, HAAB, HCAB, HCAB1, HAAT, G6PD, CRYAC, HIVGT, HIVR, HIV1, HIV12, HIVPC, HIVRPI    Lab Results   Component Value Date/Time    Vancomycin,trough 12.1 12/17/2016 12:37 AM    CK 31 12/27/2016 02:24 AM    CK 29 12/26/2016 05:56 PM       Lab Results   Component Value Date/Time    Color YELLOW/STRAW 02/02/2017 10:07 PM    Appearance CLEAR 02/02/2017 10:07 PM    Specific gravity 1.015 12/26/2016 05:56 PM    pH (UA) 7.5 02/02/2017 10:07 PM    Protein NEGATIVE  02/02/2017 10:07 PM    Glucose 500 02/02/2017 10:07 PM    Ketone NEGATIVE  02/02/2017 10:07 PM    Bilirubin NEGATIVE  02/02/2017 10:07 PM    Blood NEGATIVE 02/02/2017 10:07 PM    Urobilinogen 0.2 02/02/2017 10:07 PM    Nitrites NEGATIVE  02/02/2017 10:07 PM    Leukocyte Esterase NEGATIVE  02/02/2017 10:07 PM    WBC 0-4 02/02/2017 10:07 PM    RBC 0-5 02/02/2017 10:07 PM    Bacteria NEGATIVE  02/02/2017 10:07 PM       IMPRESSION  · COPD exacerbation  · Acute on chronic respiratory failure   · Chronic pain  · HTN, HLD  · Depression, anxiety  · Hypothyroidism  · GERD  · JEROME: on home BiPAP    PLAN  · Continue supplemental O2 to maintain sats >90%  · Scheduled duoneraul Breo (in substitution for Symbicort)  · Wean solumedrol to 60mg q 8hr  · Cont azithromycin for 5 day total course  · Continue flonase, claritin  · Home BiPAP nightly; patient will get family member to bring in from home  · GI prophylaxis: not indicated  · DVT prophylaxis: Lovenox      Thank you for allowing us to participate in the care of this patient. We will be happy to follow along in his progress with you. We will see as needed over the weekend over the weekend and check back again on Monday. Please call with questions. Les Graff

## 2017-02-03 NOTE — PROGRESS NOTES
Bedside and Verbal shift change report given to Devaughn Najjar, RN (oncoming nurse) by Dariana Chapin RN (offgoing nurse). Report included the following information SBAR, Kardex, ED Summary, Intake/Output, Recent Results, Med Rec Status and Cardiac Rhythm NSR.     2157: Pt drowsy, with periods of sleep. Noted pt experiencing involuntary arm, leg and abdominal movements while sleeping. Pt aware and stated, \" it started about a year ago from all the morphine\". Telemetry monitor picking up frequent artifact from the involuntary movement. Bedside and Verbal shift change report given to SID Ritchie RN (oncoming nurse) by Devaughn Najjar, RN (offgoing nurse). Report included the following information SBAR, Kardex, ED Summary, Intake/Output, Recent Results, Med Rec Status and Cardiac Rhythm NSR.

## 2017-02-03 NOTE — PROGRESS NOTES
[facility] 79  [address] [facility], [address] [place] [address]  [phone]      Medical Progress Note      NAME: Shruti Breumen. :  1955  MRM:  [id number]    Date/Time: 2/3/2017  11:18 AM         Subjective:     Chief Complaint:  \"I still feel SOB\"     Pt reports significant SOB even when going to the bathroom. (+) cough productive of green sputum. No CP. Nebs help, but per pt, short lived     ROS:  (bold if positive, if negative)      Cough  SOB  Sputum  Wheezing         Objective:       Vitals:          Last 24hrs VS reviewed since prior progress note. Most recent are:    Visit Vitals    /72 (BP 1 Location: Right arm, BP Patient Position: At rest)    Pulse 76    Temp 98.3 °F (36.8 °C)    Resp 18    Ht 5' 10\" (1.778 m)    Wt 114.3 kg (252 lb)    SpO2 95%    BMI 36.16 kg/m2     SpO2 Readings from Last 6 Encounters:   17 95%   16 99%   16 98%   16 97%   14 97%   13 96%    O2 Flow Rate (L/min): 2 l/min     Intake/Output Summary (Last 24 hours) at 17 1118  Last data filed at 17 0857   Gross per 24 hour   Intake             1380 ml   Output             1325 ml   Net               55 ml          Exam:     Physical Exam:    Gen: Morbidly obese   HEENT:  Pink conjunctivae, PERRL, hearing intact to voice, moist mucous membranes  Neck:  Supple, without masses, thyroid non-tender  Resp: Poor air movement.  No wheezing appreciated   Card:  No murmurs, normal S1, S2 without thrills, bruits or peripheral edema  Abd:  Soft, non-tender, non-distended, normoactive bowel sounds are present  Musc:  No cyanosis or clubbing  Skin:  No rashes or ulcers, skin turgor is good  Neuro:  Cranial nerves 3-12 are grossly intact,  strength is 5/5 bilaterally and dorsi / plantarflexion is 5/5 bilaterally, follows commands appropriately  Psych:  Good insight, oriented to person, place and time, alert    Medications Reviewed: (see below)    Lab Data Reviewed: (see below)    ______________________________________________________________________    Medications:     Current Facility-Administered Medications   Medication Dose Route Frequency    [START ON 2/4/2017] fluticasone-vilanterol (BREO ELLIPTA) 200mcg-25mcg/puff  1 Puff Inhalation DAILY    methylPREDNISolone (PF) (SOLU-MEDROL) injection 125 mg  125 mg IntraVENous Q8H    sodium chloride (NS) flush 5-10 mL  5-10 mL IntraVENous Q8H    sodium chloride (NS) flush 5-10 mL  5-10 mL IntraVENous PRN    azithromycin (ZITHROMAX) 500 mg in 0.9% sodium chloride (MBP/ADV) 250 mL  500 mg IntraVENous Q24H    ondansetron (ZOFRAN) injection 4 mg  4 mg IntraVENous Q4H PRN    HYDROcodone-acetaminophen (NORCO) 5-325 mg per tablet 1 Tab  1 Tab Oral Q4H PRN    zolpidem (AMBIEN) tablet 5 mg  5 mg Oral QHS PRN    enoxaparin (LOVENOX) injection 40 mg  40 mg SubCUTAneous Q24H    labetalol (NORMODYNE) tablet 100 mg  100 mg Oral BID    levothyroxine (SYNTHROID) tablet 50 mcg  50 mcg Oral ACB    DULoxetine (CYMBALTA) capsule 90 mg  90 mg Oral DAILY    cholecalciferol (VITAMIN D3) tablet 1,000 Units  1,000 Units Oral DAILY    fluticasone (FLONASE) 50 mcg/actuation nasal spray 1 Spray  1 Spray Both Nostrils DAILY    loratadine (CLARITIN) tablet 10 mg  10 mg Oral DAILY    busPIRone (BUSPAR) tablet 20 mg  20 mg Oral TID    gabapentin (NEURONTIN) capsule 300 mg  300 mg Oral Q6H    senna-docusate (PERICOLACE) 8.6-50 mg per tablet 3 Tab  3 Tab Oral DAILY    simvastatin (ZOCOR) tablet 10 mg  10 mg Oral QHS    albuterol-ipratropium (DUO-NEB) 2.5 MG-0.5 MG/3 ML  3 mL Nebulization Q4H RT            Lab Review:     Recent Labs      02/03/17   0414  02/02/17   1525   WBC  7.7  7.4   HGB  10.4*  10.6*   HCT  33.5*  34.9*   PLT  255  276     Recent Labs      02/03/17   0414  02/02/17   1525   NA  144  144   K  3.8  4.0   CL  103  104   CO2  37*  40*   GLU  141*  119*   BUN  13  11   CREA  0.64*  0.81   CA  8.8  8.9   MG  1.9 2. 0   PHOS  3.0   --      No components found for: [place]         Assessment / Plan:   Acute on chronic respiratory failure / COPD exacerbation:   -continue nebs, IV steroids and antibiotics.  Not overtly wheezing   -check 6 min walk  -increase Breo      Elevated troponin: outpt lexiscan scheduled for 2/13  -very mild elevation; likely 2/2 COPD; outpt stress as scheduled      Depression with anxiety:   -come buspar and cymbalta; ?if anxiety could be contributing to sensation of SOB      Hypothyroidism  -TSH markedly low; check free T4; may need to stop levothyroxine      Chronic pain of both knees:   -on norco and gabapentin     Essential hypertension:   -on labetalol     Hyperlipidemia:   -on Zocor    Total time spent with patient: 28 [address] discussed with: Patient, Family and Nursing Staff    Discussed:  Code Status, Care Plan and D/C Planning    Prophylaxis:  Lovenox    Disposition:  Home w/Family           ___________________________________________________    Attending Physician: Harmony Storey MD

## 2017-02-03 NOTE — PROGRESS NOTES
2-3-2017 CASE MANAGEMENT NOTE:  Per the MD order, I sent a resumption for home health RN,PT and OT to [facility] with a projected discharge date of 2-5-2017. IF PT IS DISCHARGED OVER THE WEEKEND PLEASE FAX THE AVS [phone] AND CALL [phone] TO NOTIFY THEM OF DISCHARGE. Care Management Interventions  PCP Verified by CM:  Yes ([address] [place] [address])  Transition of Care Consult (CM Consult): [address]: No  Reason Outside [facility]: Patient already serviced by other home care/hospice agency  Discharge Durable Medical Equipment: No (Has a cane, rollator, wheelchair, electric scooter, oxygen and BiPaP)  Physical Therapy Consult: Yes  Occupational Therapy Consult: Yes  Current Support Network:  (24 year old grand-son lives with the pt)  Confirm Follow Up Transport: Family  Discharge Location  Discharge Placement:  (Home with [facility] continuing)    Franchesak Burnett CM

## 2017-02-03 NOTE — PROGRESS NOTES
physical Therapy EVALUATION/discharge including 6 minute walk test results  Patient: Batsheva Boykin. (66 y.o. male)  Date: 2/3/2017  Primary Diagnosis: COPD exacerbation (HCC)        Precautions:   WBAT, Fall    ASSESSMENT :  Based on the objective data described below, the patient presents with shortness of breath following a COPD exacerbation. Patient has been admitted to the hospital x3 in the past few months for same diagnosis, he requires supplemental O2 at rest and with activity prior to admission at 2 LPM.  Patient today has been ambulating to rest room without difficulty in room. He reports prior to admission he uses a rollator or motorized scooter for long distance ambulation, he is able to be Independent for house hold distances. He lives alone and has no steps to enter the home. Patient currently MOD I with RW with all upright mobility. He does desaturate with activity and requires supplemental O2, see below. Patient is currenlty at his baseline for mobility and will not require any additional PT services while in the hospital or at discharge. Nursing notified that he is safe to ambulate in room with RW. Documentation for home O2:     ROOM AIR    AT REST   O2 SATS  88% HR  120 bpm   ROOM AIR WITH ACTIVITY 02 SATS  85% HR  132 bpm   (2    ) LITERS OF O2 WITH ACTIVITY O2 SATS  90% HR  113 bpm   (2    )LITERS OF 02 PATIENT LEFT COMFORTABLY  SITTING/SUPINE 02 SATS  93% HR  107 bpm       .    Skilled physical therapy is not indicated at this time. PLAN :  Discharge Recommendations: None  Further Equipment Recommendations for Discharge: owns all needed DME       SUBJECTIVE:   Patient stated Beckie Otero can feel when my oxygen gets low.     OBJECTIVE DATA SUMMARY:   HISTORY:    Past Medical History   Diagnosis Date    Anxiety     Chronic pain due to injury     Chronic respiratory insufficiency     COPD (chronic obstructive pulmonary disease) (HCC)     Depression     GERD (gastroesophageal reflux disease)     History of Bell's palsy 2016    Hypercholesteremia     Hypothyroid      Past Surgical History   Procedure Laterality Date    Hx orthopaedic       left leg, right hip, knee pelvis     Prior Level of Function/Home Situation:   Personal factors and/or comorbidities impacting plan of care:     Home Situation  Home Environment: Private residence  # Steps to Enter: 0  One/Two Story Residence: One story  Living Alone: Yes  Support Systems: Family member(s)  Patient Expects to be Discharged to[de-identified] Private residence  Current DME Used/Available at Home: Walker, rolling, Wheelchair, [address], [place], straight    EXAMINATION/PRESENTATION/DECISION MAKING:   Critical Behavior:              Skin:  All exposed intact  Strength:    Strength: Within functional limits                    Tone & Sensation:   Tone: Normal              Sensation: Intact               Range Of Motion:  AROM: Within functional limits           PROM: Within functional limits           Coordination:  Coordination: Within functional limits    Functional Mobility:  Bed Mobility:  Rolling: Independent  Supine to Sit: Independent  Sit to Supine: Independent     Transfers:  Sit to Stand: Modified independent  Stand to Sit: Modified independent        Bed to Chair: Modified independent              Balance:   Sitting: Intact  Standing: Intact  Ambulation/Gait Training:  Distance (ft): 200 Feet (ft)  Assistive Device: Walker, rolling;Gait belt  Ambulation - Level of Assistance: Modified independent     Gait Description (WDL): Exceptions to WDL                                         6 MWT results:   150 feet  Brennan Rating of Perceived exertion (0-10 point scale):   Pre  95% on 2L Beginnin   Pre  85% on room air     Mid walk: 8   Post  90% on 2L End: 5   Post  113 bpm    Assistive device used: Assistive Device: Walker, rolling;Gait belt        Normative data: 3262 years old = 0 feet; Men 39-80 years old = 1889 feet;  Women 3680 years xxr=1355 feet  Modified 10 point Brennan RPE scale utilized where 0 = no breathlessness at all; 10 = maximum exertion  Please refer to the flowsheet for any additional vital signs taken during this treatment. Therapeutic Exercises:     Functional Measure:  Barthel Index:    Bathin  Bladder: 10  Bowels: 10  Groomin  Dressing: 10  Feeding: 10  Mobility: 15  Stairs: 10  Toilet Use: 10  Transfer (Bed to Chair and Back): 15  Total: 100       Barthel and G-code impairment scale:  Percentage of impairment CH  0% CI  1-19% CJ  20-39% CK  40-59% CL  60-79% CM  80-99% CN  100%   Barthel Score 0-100 100 99-80 79-60 59-40 20-39 1-19   0   Barthel Score 0-20 20 17-19 13-16 9-12 5-8 1-4 0      The Barthel ADL Index: Guidelines  1. The index should be used as a record of what a patient does, not as a record of what a patient could do. 2. The main aim is to establish degree of independence from any help, physical or verbal, however minor and for whatever reason. 3. The need for supervision renders the patient not independent. 4. A patient's performance should be established using the best available evidence. Asking the patient, friends/relatives and nurses are the usual sources, but direct observation and common sense are also important. However direct testing is not needed. 5. Usually the patient's performance over the preceding 24-48 hours is important, but occasionally longer periods will be relevant. 6. Middle categories imply that the patient supplies over 50 per cent of the effort. 7. Use of aids to be independent is allowed. Marceal Ren., Barthel, D.W. ([id number]). Functional evaluation: the Barthel Index. [address] [facility] (14)2. YVONNE David, Nuzhat Anguiano., Uziel Nava., Umu Harada, [address] [place] [address] (). Measuring the change indisability after inpatient rehabilitation; comparison of the responsiveness of the Barthel Index and Functional [place] Measure.  Journal of Neurology, Neurosurgery, and Psychiatry, 66(4), [phone]. JEFFREY Mejia, ANDREA Jaeger, & Zuly Munguia M.A. (2004.) Assessment of post-stroke quality of life in cost-effectiveness studies: The usefulness of the Barthel Index and the EuroQoL-5D. Quality of Life Research, 13, 216-10       G codes: In compliance with CMSs Claims Based Outcome Reporting, the following G-code set was chosen for this patient based on their primary functional limitation being treated: The outcome measure chosen to determine the severity of the functional limitation was the Barthel Index with a score of 100/100 which was correlated with the impairment scale. ? Mobility - Walking and Moving Around:     - CURRENT STATUS: CH - 0% impaired, limited or restricted    - GOAL STATUS: CH - 0% impaired, limited or restricted    - D/C STATUS:  CH - 0% impaired, limited or restricted      Physical Therapy Evaluation Charge Determination   History Examination Presentation Decision-Making   HIGH Complexity :3+ comorbidities / personal factors will impact the outcome/ POC  LOW Complexity : 1-2 Standardized tests and measures addressing body structure, function, activity limitation and / or participation in recreation  MEDIUM Complexity : Evolving with changing characteristics  Other outcome measures Barthel Index  LOW       Based on the above components, the patient evaluation is determined to be of the following complexity level: LOW     Pain:  Pain Scale 1: Numeric (0 - 10)  Pain Intensity 1: 7  Pain Location 1: Leg;Knee;Back  Pain Orientation 1: Left;Posterior  Pain Description 1: Aching;Constant; Sharp     Activity Tolerance:   No complications  Please refer to the flowsheet for vital signs taken during this treatment.   After treatment:   [x]         Patient left in no apparent distress sitting up in chair  []         Patient left in no apparent distress in bed  [x]         Call bell left within reach  [x]         Nursing notified  []         Caregiver present  []         Bed alarm activated    COMMUNICATION/EDUCATION:   Communication/Collaboration:  [x]   Fall prevention education was provided and the patient/caregiver indicated understanding. [x]   Patient/family have participated as able and agree with findings and recommendations. []   Patient is unable to participate in plan of care at this time.   Findings and recommendations were discussed with: Registered Nurse    Thank you for this referral.  Roxana Bourgeois, PT, DPT   Time Calculation: 17 mins

## 2017-02-03 NOTE — PROGRESS NOTES
Occupational Therapy EVALUATION/discharge  Patient: Lito Cohen (66 y.o. male)  Date: 2/3/2017  Primary Diagnosis: COPD exacerbation (HCC)        Precautions:  WBAT, Fall    ASSESSMENT:   Based on the objective data described below, the patient presents at baseline in regards to functional abilities. Patient was admitted on 2/2 for SOB x2 weeks/ COPD exacerbation. Patient with multiple recent admissions for the same issue. Patient reports his grandson lives with him and provides transportation when needed. PTA, patient was independent with ADLs, uses a rollator for short distances and motorized scooter for longer distances, and requires 2L O2 at home. Patient was independent with bed mobility and required use of RW (mod I) for OOB transfers. Patient is independent to mod I for all ADLs. Patient educated on energy conservation, pacing, and pursed lip breathing techniques; He confirmed understanding and reports utilizing techniques at home prior to this admit. Patient is at his baseline and has no further skilled OT needs. Patient encouraged to be OOB as often as tolerated, instructed to complete simple exercises frequently throughout the day, and contribute as much to self care as able to ensure no decompensation while at the hospital; He was agreeable. Discharge Recommendations: None  Further Equipment Recommendations for Discharge: none       SUBJECTIVE:   Patient was agreeable to OT evaluation and confirmed understanding of all information.       OBJECTIVE DATA SUMMARY:   HISTORY:   Past Medical History   Diagnosis Date    Anxiety     Chronic pain due to injury     Chronic respiratory insufficiency     COPD (chronic obstructive pulmonary disease) (HCC)     Depression     GERD (gastroesophageal reflux disease)     History of Bell's palsy 12/29/2016    Hypercholesteremia     Hypothyroid      Past Surgical History   Procedure Laterality Date    Hx orthopaedic       left leg, right hip, knee pelvis       Prior Level of Function/Home Situation: Patient reports his grandson lives with him and provides transportation when needed; Aigou Lyndsay works during the day and patient is alone. Patient reports independence with self care tasks and reports use of rollator for short distances and motorized scooter for longer distances. Expanded or extensive additional review of patient history: Reviewed encounters from previous admissions for same issue. Home Situation  Home Environment: Private residence  # Steps to Enter: 0  One/Two Story Residence: One story  Living Alone: No; reports his grandson stays with him  Support Systems: Family member(s)  Patient Expects to be Discharged to[de-identified] Private residence  Current DME Used/Available at Home: Walker, rolling, Wheelchair, Shanika Sheller, straight  Tub or Shower Type: Tub/Shower combination  [x]  Right hand dominant   []  Left hand dominant    EXAMINATION OF PERFORMANCE DEFICITS:  Cognitive/Behavioral Status:  Neurologic State: Alert  Orientation Level: Oriented X4  Cognition: Appropriate decision making; Appropriate for age attention/concentration; Appropriate safety awareness  Perception: Appears intact  Perseveration: No perseveration noted  Safety/Judgement: Awareness of environment  Skin: Intact in the uppers  Edema: None noted in the uppers  Vision/Perceptual:    Tracking: Able to track stimulus in all quadrants w/o difficulty    Diplopia: No    Acuity: Within Defined Limits       Range of Motion:  AROM: Within functional limits in the uppers  PROM: Within functional limits in the uppers     Strength:  WDL in the uppers in the uppers     Coordination:  Fine Motor Skills-Upper: Left Intact; Right Intact    Gross Motor Skills-Upper: Left Intact; Right Intact     Tone & Sensation:  Tone: Normal  Sensation: Intact    Balance:  Sitting: Intact  Standing: Intact    Functional Mobility and Transfers for ADLs:  Bed Mobility:  Rolling: Independent  Supine to Sit: Independent  Sit to Supine: Independent  Scooting: Independent    Transfers:  Sit to Stand: Modified independent  Stand to Sit: Modified independent  Bed to Chair: Modified independent  Toilet Transfer : Modified independent    ADL Assessment:  Feeding: Independent    Oral Facial Hygiene/Grooming: Independent    Bathing: Modified independent    Upper Body Dressing: Independent    Lower Body Dressing: Modified independent    Toileting: Modified independent     Cognitive Retraining  Safety/Judgement: Awareness of environment    Functional Measure:  Barthel Index:    Bathin  Bladder: 10  Bowels: 10  Groomin  Dressing: 10  Feeding: 10  Mobility: 15  Stairs: 10  Toilet Use: 10  Transfer (Bed to Chair and Back): 15  Total: 100       Barthel and G-code impairment scale:  Percentage of impairment CH  0% CI  1-19% CJ  20-39% CK  40-59% CL  60-79% CM  80-99% CN  100%   Barthel Score 0-100 100 99-80 79-60 59-40 20-39 1-19   0   Barthel Score 0-20 20 17-19 13-16 9-12 5-8 1-4 0      The Barthel ADL Index: Guidelines  1. The index should be used as a record of what a patient does, not as a record of what a patient could do. 2. The main aim is to establish degree of independence from any help, physical or verbal, however minor and for whatever reason. 3. The need for supervision renders the patient not independent. 4. A patient's performance should be established using the best available evidence. Asking the patient, friends/relatives and nurses are the usual sources, but direct observation and common sense are also important. However direct testing is not needed. 5. Usually the patient's performance over the preceding 24-48 hours is important, but occasionally longer periods will be relevant. 6. Middle categories imply that the patient supplies over 50 per cent of the effort. 7. Use of aids to be independent is allowed. Marcela Mcclure.Christinael, D.W. ([id number]). Functional evaluation: the Barthel Index.  [address] [facility] ([address], [email or web address], Kevin Cowan., Scotty De Anda., Ken Marc. (1999). Measuring the change indisability after inpatient rehabilitation; comparison of the responsiveness of the Barthel Index and Functional [place] Measure. Journal of Neurology, Neurosurgery, and Psychiatry, 66(4), 186-231. JEFFREY Tan, ANDREA Jaeger, & Eduarda Ro M.A. (2004.) Assessment of post-stroke quality of life in cost-effectiveness studies: The usefulness of the Barthel Index and the EuroQoL-5D. Quality of Life Research, 13, 394-95       G codes: In compliance with CMSs Claims Based Outcome Reporting, the following G-code set was chosen for this patient based on their primary functional limitation being treated: The outcome measure chosen to determine the severity of the functional limitation was the Barthel Index with a score of 100/100 which was correlated with the impairment scale. ? Self Care:     - CURRENT STATUS: CH - 0% impaired, limited or restricted    - GOAL STATUS: CH - 0% impaired, limited or restricted    - D/C STATUS:  CH - 0% impaired, limited or restricted     Occupational Therapy Evaluation Charge Determination   History Examination Decision-Making   LOW Complexity : Brief history review  LOW Complexity : 1-3 performance deficits relating to physical, cognitive , or psychosocial skils that result in activity limitations and / or participation restrictions  LOW Complexity : No comorbidities that affect functional and no verbal or physical assistance needed to complete eval tasks       Based on the above components, the patient evaluation is determined to be of the following complexity level: LOW   Pain:  Pain Scale 1: Numeric (0 - 10)  No c/o pain before, during, or after activity     Activity Tolerance:   Patient tolerated eval well.     After treatment:   [x]  Patient left in no apparent distress sitting up in chair  []  Patient left in no apparent distress in bed  [x]  Call bell left within reach  [x]  Nursing notified  []  Caregiver present  []  Bed alarm activated    COMMUNICATION/EDUCATION:   Communication/Collaboration:  [x]      Home safety education was provided and the patient/caregiver indicated understanding. [x]      Patient/family have participated as able and agree with findings and recommendations. []      Patient is unable to participate in plan of care at this time. Findings and recommendations were discussed with: Registered Nurse and patient.     Joo Pryor OTR/L  Time Calculation: 26 mins

## 2017-02-03 NOTE — PROGRESS NOTES
1610: Went in pt room to admin pain medication per pt request & pt was sleeping. Will check back. 1658: TRANSFER - OUT REPORT:    Verbal report given to Nuvia Mancia (name) on [facility].  being transferred to [facility] (unit) for routine progression of care       Report consisted of patients Situation, Background, Assessment and   Recommendations(SBAR). Information from the following report(s) SBAR, Kardex, Intake/Output, Recent Results and Cardiac Rhythm NSR was reviewed with the receiving nurse. Opportunity for questions and clarification was provided.       Patient transported with:   O2 @ 2 liters   Tech

## 2017-02-04 VITALS
SYSTOLIC BLOOD PRESSURE: 137 MMHG | WEIGHT: 252 LBS | RESPIRATION RATE: 18 BRPM | HEIGHT: 70 IN | OXYGEN SATURATION: 98 % | DIASTOLIC BLOOD PRESSURE: 69 MMHG | HEART RATE: 79 BPM | BODY MASS INDEX: 36.08 KG/M2 | TEMPERATURE: 98.3 F

## 2017-02-04 LAB
ANION GAP BLD CALC-SCNC: 3 MMOL/L (ref 5–15)
BASOPHILS # BLD AUTO: 0 K/UL (ref 0–0.1)
BASOPHILS # BLD: 0 % (ref 0–1)
BUN SERPL-MCNC: 18 MG/DL (ref 6–20)
BUN/CREAT SERPL: 25 (ref 12–20)
CALCIUM SERPL-MCNC: 8.4 MG/DL (ref 8.5–10.1)
CHLORIDE SERPL-SCNC: 103 MMOL/L (ref 97–108)
CO2 SERPL-SCNC: 36 MMOL/L (ref 21–32)
CREAT SERPL-MCNC: 0.73 MG/DL (ref 0.7–1.3)
EOSINOPHIL # BLD: 0 K/UL (ref 0–0.4)
EOSINOPHIL NFR BLD: 0 % (ref 0–7)
ERYTHROCYTE [DISTWIDTH] IN BLOOD BY AUTOMATED COUNT: 13.9 % (ref 11.5–14.5)
GLUCOSE SERPL-MCNC: 139 MG/DL (ref 65–100)
HCT VFR BLD AUTO: 32.6 % (ref 36.6–50.3)
HGB BLD-MCNC: 10.3 G/DL (ref 12.1–17)
LYMPHOCYTES # BLD AUTO: 6 % (ref 12–49)
LYMPHOCYTES # BLD: 0.8 K/UL (ref 0.8–3.5)
MAGNESIUM SERPL-MCNC: 2 MG/DL (ref 1.6–2.4)
MCH RBC QN AUTO: 29.8 PG (ref 26–34)
MCHC RBC AUTO-ENTMCNC: 31.6 G/DL (ref 30–36.5)
MCV RBC AUTO: 94.2 FL (ref 80–99)
MONOCYTES # BLD: 0.7 K/UL (ref 0–1)
MONOCYTES NFR BLD AUTO: 5 % (ref 5–13)
NEUTS SEG # BLD: 12.4 K/UL (ref 1.8–8)
NEUTS SEG NFR BLD AUTO: 89 % (ref 32–75)
PLATELET # BLD AUTO: 272 K/UL (ref 150–400)
POTASSIUM SERPL-SCNC: 3.9 MMOL/L (ref 3.5–5.1)
RBC # BLD AUTO: 3.46 M/UL (ref 4.1–5.7)
RBC MORPH BLD: ABNORMAL
SODIUM SERPL-SCNC: 142 MMOL/L (ref 136–145)
WBC # BLD AUTO: 13.9 K/UL (ref 4.1–11.1)

## 2017-02-04 PROCEDURE — 74011250636 HC RX REV CODE- 250/636: Performed by: PHYSICIAN ASSISTANT

## 2017-02-04 PROCEDURE — 80048 BASIC METABOLIC PNL TOTAL CA: CPT | Performed by: INTERNAL MEDICINE

## 2017-02-04 PROCEDURE — 74011250637 HC RX REV CODE- 250/637: Performed by: INTERNAL MEDICINE

## 2017-02-04 PROCEDURE — 36415 COLL VENOUS BLD VENIPUNCTURE: CPT | Performed by: INTERNAL MEDICINE

## 2017-02-04 PROCEDURE — 74011636637 HC RX REV CODE- 636/637: Performed by: INTERNAL MEDICINE

## 2017-02-04 PROCEDURE — 77010033678 HC OXYGEN DAILY

## 2017-02-04 PROCEDURE — 94640 AIRWAY INHALATION TREATMENT: CPT

## 2017-02-04 PROCEDURE — 85025 COMPLETE CBC W/AUTO DIFF WBC: CPT | Performed by: INTERNAL MEDICINE

## 2017-02-04 PROCEDURE — 74011000250 HC RX REV CODE- 250: Performed by: INTERNAL MEDICINE

## 2017-02-04 PROCEDURE — 83735 ASSAY OF MAGNESIUM: CPT | Performed by: INTERNAL MEDICINE

## 2017-02-04 RX ORDER — PREDNISONE 20 MG/1
TABLET ORAL
Qty: 14 TAB | Refills: 0 | Status: SHIPPED | OUTPATIENT
Start: 2017-02-04

## 2017-02-04 RX ORDER — PREDNISONE 20 MG/1
60 TABLET ORAL
Status: DISCONTINUED | OUTPATIENT
Start: 2017-02-04 | End: 2017-02-04 | Stop reason: HOSPADM

## 2017-02-04 RX ORDER — LEVOFLOXACIN 750 MG/1
750 TABLET ORAL DAILY
Qty: 4 TAB | Refills: 0 | Status: SHIPPED | OUTPATIENT
Start: 2017-02-04 | End: 2017-02-08

## 2017-02-04 RX ORDER — GUAIFENESIN 600 MG/1
600 TABLET, EXTENDED RELEASE ORAL 2 TIMES DAILY
Qty: 10 TAB | Refills: 0 | Status: SHIPPED | OUTPATIENT
Start: 2017-02-04 | End: 2017-02-09

## 2017-02-04 RX ADMIN — LABETALOL HCL 100 MG: 100 TABLET, FILM COATED ORAL at 08:29

## 2017-02-04 RX ADMIN — Medication 3 TABLET: at 08:29

## 2017-02-04 RX ADMIN — FLUTICASONE FUROATE AND VILANTEROL TRIFENATATE 1 PUFF: 200; 25 POWDER RESPIRATORY (INHALATION) at 09:00

## 2017-02-04 RX ADMIN — IPRATROPIUM BROMIDE AND ALBUTEROL SULFATE 3 ML: .5; 2.5 SOLUTION RESPIRATORY (INHALATION) at 00:38

## 2017-02-04 RX ADMIN — IPRATROPIUM BROMIDE AND ALBUTEROL SULFATE 3 ML: .5; 2.5 SOLUTION RESPIRATORY (INHALATION) at 04:14

## 2017-02-04 RX ADMIN — LORATADINE 10 MG: 10 TABLET ORAL at 08:29

## 2017-02-04 RX ADMIN — IPRATROPIUM BROMIDE AND ALBUTEROL SULFATE 3 ML: .5; 2.5 SOLUTION RESPIRATORY (INHALATION) at 07:29

## 2017-02-04 RX ADMIN — HYDROCODONE BITARTRATE AND ACETAMINOPHEN 1 TABLET: 5; 325 TABLET ORAL at 08:39

## 2017-02-04 RX ADMIN — LEVOTHYROXINE SODIUM 50 MCG: 0.05 TABLET ORAL at 06:19

## 2017-02-04 RX ADMIN — BUSPIRONE HYDROCHLORIDE 20 MG: 5 TABLET ORAL at 08:28

## 2017-02-04 RX ADMIN — GABAPENTIN 300 MG: 300 CAPSULE ORAL at 06:18

## 2017-02-04 RX ADMIN — VITAMIN D, TAB 1000IU (100/BT) 1000 UNITS: 25 TAB at 08:29

## 2017-02-04 RX ADMIN — FLUTICASONE PROPIONATE 1 SPRAY: 50 SPRAY, METERED NASAL at 09:00

## 2017-02-04 RX ADMIN — GABAPENTIN 300 MG: 300 CAPSULE ORAL at 11:15

## 2017-02-04 RX ADMIN — IPRATROPIUM BROMIDE AND ALBUTEROL SULFATE 3 ML: .5; 2.5 SOLUTION RESPIRATORY (INHALATION) at 11:08

## 2017-02-04 RX ADMIN — PREDNISONE 60 MG: 20 TABLET ORAL at 10:27

## 2017-02-04 RX ADMIN — LEVOFLOXACIN 750 MG: 250 TABLET, FILM COATED ORAL at 10:28

## 2017-02-04 RX ADMIN — Medication 10 ML: at 06:19

## 2017-02-04 RX ADMIN — METHYLPREDNISOLONE SODIUM SUCCINATE 60 MG: 125 INJECTION, POWDER, FOR SOLUTION INTRAMUSCULAR; INTRAVENOUS at 01:52

## 2017-02-04 RX ADMIN — DULOXETINE HYDROCHLORIDE 90 MG: 30 CAPSULE, DELAYED RELEASE ORAL at 08:28

## 2017-02-04 NOTE — PROGRESS NOTES
Primary Nurse Kym Salazar RN and BRITTA jenkins performed a dual skin assessment on this patient No impairment noted  Luis score is 21

## 2017-02-04 NOTE — DISCHARGE SUMMARY
Physician Discharge Summary     Patient ID:  Naz Mar  [id number]  63 y.o.  1955    Admit date: 2/2/2017    Discharge date and time: 2/4/2017    Admission Diagnoses: COPD exacerbation [facility])    Discharge Diagnoses/Hospital Course   Acute on chronic respiratory failure / COPD exacerbation: improved on nebs, IV steroids and antibiotics. Not overtly wheezing. Passed 6 minute walk on home O2 settings. Elevated troponin: outpt lexiscan scheduled for Feb. Very mild elevation; likely 2/2 COPD; outpt stress as scheduled. Denies chest pain       Depression with anxiety: on buspar and cymbalta; ?if anxiety could be contributing to sensation of SOB       Hypothyroidism: TSH low but free T4 WNL. Will continue levothyroxine      Chronic pain of both knees: on norco and gabapentin      Essential hypertension: on labetalol      Hyperlipidemia: on Zocor    PCP: Wendi Sanchez, MD      Consults: Pulmonary/Intensive care    Discharge Exam:  Visit Vitals    /69 (BP 1 Location: Left arm, BP Patient Position: At rest)    Pulse 79    Temp 98.3 °F (36.8 °C)    Resp 18    Ht 5' 10\" (1.778 m)    Wt 114.3 kg (252 lb)    SpO2 98%    BMI 36.16 kg/m2     Gen: Morbidly obese   HEENT: Pink conjunctivae, PERRL, hearing intact to voice, moist mucous membranes  Neck: Supple, without masses, thyroid non-tender  Resp: Improved air movement.  No wheezing appreciated   Card: No murmurs, normal S1, S2 without thrills, bruits or peripheral edema  Abd: Soft, non-tender, non-distended, normoactive bowel sounds are present  Musc: No cyanosis or clubbing  Skin: No rashes or ulcers, skin turgor is good  Neuro: Cranial nerves 3-12 are grossly intact,  strength is 5/5 bilaterally and dorsi / plantarflexion is 5/5 bilaterally, follows commands appropriately  Psych: Good insight, oriented to person, place and time, alert    Disposition: home    Patient Instructions:   Current Discharge Medication List      START taking these medications    Details   levoFLOXacin (LEVAQUIN) 750 mg tablet Take 1 Tab by mouth daily for 4 days. Qty: 4 Tab, Refills: 0      guaiFENesin ER (MUCINEX) 600 mg ER tablet Take 1 Tab by mouth two (2) times a day for 5 days. Qty: 10 Tab, Refills: 0         CONTINUE these medications which have CHANGED    Details   predniSONE (DELTASONE) 20 mg tablet Please take 60mg x 2 days then 40mg x 2 days then 20mg x 2 days then 10mg x 2 days then 5mg x 2 days  Qty: 14 Tab, Refills: 0         CONTINUE these medications which have NOT CHANGED    Details   labetalol (NORMODYNE) 100 mg tablet Take 1 Tab by mouth two (2) times a day. Qty: 60 Tab, Refills: 0      levothyroxine (SYNTHROID) 50 mcg tablet Take 1 Tab by mouth Daily (before breakfast). Qty: 30 Tab, Refills: 0      DULoxetine (CYMBALTA) 30 mg capsule Take 90 mg by mouth daily. senna-docusate (PERICOLACE) 8.6-50 mg per tablet Take 1 Tab by mouth two (2) times a day. Qty: 60 Tab, Refills: 1      cholecalciferol (VITAMIN D3) 1,000 unit tablet Take 1,000 Units by mouth daily (with lunch). fluticasone (FLONASE) 50 mcg/actuation nasal spray 1 [place] by Both Nostrils route daily. albuterol (PROVENTIL HFA, VENTOLIN HFA, PROAIR HFA) 90 mcg/actuation inhaler Take 2 Puffs by inhalation every four (4) hours as needed for Wheezing.      loratadine (CLARITIN) 10 mg tablet Take 10 mg by mouth daily. budesonide-formoterol (SYMBICORT) 160-4.5 mcg/actuation HFA inhaler Take 2 Puffs by inhalation two (2) times a day. vardenafil (LEVITRA) 20 mg tablet Take 20 mg by mouth as needed. One tab one hr prior to activity. Max twice/month      tiotropium (SPIRIVA WITH HANDIHALER) 18 mcg inhalation capsule Take 1 Cap by inhalation nightly. albuterol (PROVENTIL VENTOLIN) 2.5 mg /3 mL (0.083 %) nebulizer solution 2.5 mg by Nebulization route four (4) times daily as needed. multivitamin with iron tablet Take 1 Tab by mouth daily (with lunch).       busPIRone (BUSPAR) 10 mg tablet Take 20 mg by mouth three (3) times daily. gabapentin (NEURONTIN) 300 mg tablet Take 300 mg by mouth every six (6) hours. simvastatin (ZOCOR) 20 mg tablet Take 10 mg by mouth nightly. oxyCODONE IR (ROXICODONE) 5 mg immediate release tablet Take 1 Tab by mouth every four (4) hours as needed for Pain.  Max Daily Amount: 30 mg.  Qty: 20 Tab, Refills: 0           Activity: Activity as tolerated  Diet: Resume previous diet  Wound Care: None needed    Follow-up with PCP as needed     Approximate time spent in patient care on day of discharge: 34 minutes     Signed:  Kiran Dailey MD  2/4/2017  12:55 PM

## 2017-02-04 NOTE — DISCHARGE INSTRUCTIONS
HOSPITALIST DISCHARGE INSTRUCTIONS  NAME: Batsheva Boykin. :  1955   MRN:  [id number]     Date/Time:  2017 10:16 AM    ADMIT DATE: 2017     DISCHARGE DATE: 2017     ADMITTING DIAGNOSIS:  COPD exacerbation     DISCHARGE DIAGNOSIS:  As above     MEDICATIONS:     · It is important that you take the medication exactly as they are prescribed. · Keep your medication in the bottles provided by the pharmacist and keep a list of the medication names, dosages, and times to be taken in your wallet. · Do not take other medications without consulting your doctor. Pain Management: per above medications    What to do at Home    Recommended diet:  Regular Diet    Recommended activity: Activity as tolerated    If you experience any of the following symptoms then please call your primary care physician or return to the emergency room if you cannot get hold of your doctor:  Fever, chills, nausea, vomiting, diarrhea, change in mentation, falling, bleeding, shortness of breath, chest pain     Follow Up:  Please follow-up at the [address] [place] [address] in 1-2 weeks     Information obtained by :  I understand that if any problems occur once I am at home I am to contact my physician. I understand and acknowledge receipt of the instructions indicated above.                                                                                                                                            Physician's or R.N.'s Signature                                                                  Date/Time                                                                                                                                              Patient or Representative Signature                                                          Date/Time

## 2017-02-04 NOTE — PROGRESS NOTES
Microbiology called and stated that patient was positive for MRSA in the nares. Dr. Akhtar Smoker notified. No orders were given at this time. Patient has been placed on contact Precaution.

## 2017-02-04 NOTE — ROUTINE PROCESS
Bedside and Verbal shift change report given to [address] [place] [address] (oncoming nurse) by Buzzy Schilder RN (offgoing nurse). Report included the following information SBAR, Kardex, Accordion and Recent Results.

## 2017-02-14 ENCOUNTER — CLINICAL SUPPORT (OUTPATIENT)
Dept: CARDIOLOGY CLINIC | Age: 62
End: 2017-02-14

## 2017-02-14 DIAGNOSIS — E78.00 HYPERCHOLESTEREMIA: ICD-10-CM

## 2017-02-14 DIAGNOSIS — R77.8 ELEVATED TROPONIN: Primary | ICD-10-CM

## 2017-02-14 DIAGNOSIS — I21.4 NSTEMI (NON-ST ELEVATED MYOCARDIAL INFARCTION) (HCC): ICD-10-CM

## 2017-02-14 NOTE — PROGRESS NOTES
ID verified per protocol. Dr. Elvin Deleon ordered study and Dr. Elvin Deleon read study. See scanned report.

## 2017-02-15 ENCOUNTER — CLINICAL SUPPORT (OUTPATIENT)
Dept: CARDIOLOGY CLINIC | Age: 62
End: 2017-02-15

## 2017-02-15 DIAGNOSIS — E78.00 HYPERCHOLESTEREMIA: ICD-10-CM

## 2017-02-15 DIAGNOSIS — I21.4 NSTEMI (NON-ST ELEVATED MYOCARDIAL INFARCTION) (HCC): ICD-10-CM

## 2017-02-15 DIAGNOSIS — R77.8 ELEVATED TROPONIN: Primary | ICD-10-CM

## 2017-02-17 ENCOUNTER — TELEPHONE (OUTPATIENT)
Dept: CARDIOLOGY CLINIC | Age: 62
End: 2017-02-17

## 2021-05-25 NOTE — PROGRESS NOTES
2-6-2017 CASE MANAGEMENT NOTE:  The pt was discharged home on 2-4-2017. Despite my notes, no one sent notification or discharge instructions to [facility]. I spoke with their  and told her of the discharge date on 2-4-2017. I also sent the discharge summary and discharge instructions to them thru [employer].  Santi Graham, BSW, CM Detail Level: Zone Action 3: Continue Discontinue Regimen: Hydrocortisone 2.5 Initiate Regimen: MetroCream bid